# Patient Record
Sex: FEMALE | Race: WHITE | NOT HISPANIC OR LATINO | Employment: OTHER | URBAN - NONMETROPOLITAN AREA
[De-identification: names, ages, dates, MRNs, and addresses within clinical notes are randomized per-mention and may not be internally consistent; named-entity substitution may affect disease eponyms.]

---

## 2017-02-16 ENCOUNTER — OFFICE VISIT (OUTPATIENT)
Dept: CARDIOLOGY | Facility: CLINIC | Age: 82
End: 2017-02-16

## 2017-02-16 VITALS
BODY MASS INDEX: 32.15 KG/M2 | HEART RATE: 55 BPM | DIASTOLIC BLOOD PRESSURE: 70 MMHG | OXYGEN SATURATION: 89 % | WEIGHT: 149 LBS | HEIGHT: 57 IN | SYSTOLIC BLOOD PRESSURE: 140 MMHG

## 2017-02-16 DIAGNOSIS — Z95.1 HX OF CABG: Chronic | ICD-10-CM

## 2017-02-16 DIAGNOSIS — I25.10 ATHEROSCLEROSIS OF NATIVE CORONARY ARTERY OF NATIVE HEART WITHOUT ANGINA PECTORIS: Primary | Chronic | ICD-10-CM

## 2017-02-16 DIAGNOSIS — E78.2 MIXED HYPERLIPIDEMIA: Chronic | ICD-10-CM

## 2017-02-16 DIAGNOSIS — I10 ESSENTIAL HYPERTENSION: Chronic | ICD-10-CM

## 2017-02-16 DIAGNOSIS — I25.10 ATHEROSCLEROSIS OF NATIVE CORONARY ARTERY OF NATIVE HEART WITHOUT ANGINA PECTORIS: ICD-10-CM

## 2017-02-16 DIAGNOSIS — F17.201 MILD TOBACCO ABUSE IN SUSTAINED REMISSION: Chronic | ICD-10-CM

## 2017-02-16 DIAGNOSIS — Z78.9 STATIN INTOLERANCE: Chronic | ICD-10-CM

## 2017-02-16 PROBLEM — E78.5 HYPERLIPIDEMIA: Chronic | Status: ACTIVE | Noted: 2017-02-16

## 2017-02-16 PROCEDURE — 99214 OFFICE O/P EST MOD 30 MIN: CPT | Performed by: NURSE PRACTITIONER

## 2017-02-16 RX ORDER — LISINOPRIL 20 MG/1
20 TABLET ORAL DAILY
COMMUNITY
End: 2017-08-29 | Stop reason: SDUPTHER

## 2017-02-16 RX ORDER — METOPROLOL SUCCINATE 50 MG/1
50 TABLET, EXTENDED RELEASE ORAL 2 TIMES DAILY
COMMUNITY
End: 2018-01-01 | Stop reason: SDUPTHER

## 2017-02-16 RX ORDER — ASPIRIN 81 MG/1
81 TABLET, CHEWABLE ORAL DAILY
COMMUNITY

## 2017-02-16 RX ORDER — LEVOTHYROXINE SODIUM 0.15 MG/1
150 TABLET ORAL DAILY
COMMUNITY

## 2017-02-16 RX ORDER — MAGNESIUM OXIDE 400 MG/1
400 TABLET ORAL DAILY
COMMUNITY

## 2017-02-16 RX ORDER — VITAMIN B COMPLEX
100 TABLET ORAL DAILY
COMMUNITY
End: 2017-08-29 | Stop reason: ALTCHOICE

## 2017-02-16 RX ORDER — FUROSEMIDE 40 MG/1
40 TABLET ORAL 2 TIMES DAILY
COMMUNITY
End: 2018-01-01 | Stop reason: SDUPTHER

## 2017-02-16 RX ORDER — CLOPIDOGREL BISULFATE 75 MG/1
75 TABLET ORAL DAILY
COMMUNITY

## 2017-02-16 RX ORDER — AMLODIPINE BESYLATE 10 MG/1
10 TABLET ORAL DAILY
COMMUNITY

## 2017-02-16 RX ORDER — GLIPIZIDE 5 MG/1
5 TABLET ORAL DAILY
COMMUNITY

## 2017-02-16 RX ORDER — BUDESONIDE AND FORMOTEROL FUMARATE DIHYDRATE 160; 4.5 UG/1; UG/1
2 AEROSOL RESPIRATORY (INHALATION)
COMMUNITY

## 2017-02-16 RX ORDER — OMEPRAZOLE 20 MG/1
20 TABLET, DELAYED RELEASE ORAL DAILY
COMMUNITY

## 2017-02-16 NOTE — PROGRESS NOTES
Subjective:     Encounter Date:02/16/2017    Chief Complaint:    Patient ID: Mis Fontaine is a 81 y.o. female here today for cardiac follow-up.    HPI     Coronary Artery Disease    Additional comments: no chest pain, hx CABG           Hypertension    Additional comments: doesn't remember what it runs at home, better since losing a little weight           Hyperlipidemia    Additional comments: developed a rash after Repatha July 2016 (notes reveal she had a flare of gout)       Last edited by GIO Harris on 2/16/2017  6:19 PM. (History)        Coronary Artery Disease   Presents for follow-up visit. Symptoms include leg swelling and shortness of breath. Pertinent negatives include no chest pain, chest pressure, chest tightness, dizziness or palpitations. Risk factors include hyperlipidemia. The symptoms have been stable. Compliance with diet is variable. Compliance with exercise is poor. Compliance with medications is good.   Hypertension   This is a chronic problem. The current episode started more than 1 year ago. The problem is unchanged. The problem is controlled. Associated symptoms include malaise/fatigue, neck pain and shortness of breath. Pertinent negatives include no blurred vision, chest pain, headaches or palpitations. Risk factors for coronary artery disease include dyslipidemia, diabetes mellitus, obesity and sedentary lifestyle. Past treatments include beta blockers, ACE inhibitors and calcium channel blockers. Compliance problems include exercise.  Hypertensive end-organ damage includes CAD/MI.   Hyperlipidemia   This is a chronic problem. The current episode started more than 1 year ago. The problem is uncontrolled. Factors aggravating her hyperlipidemia include beta blockers. Associated symptoms include shortness of breath. Pertinent negatives include no chest pain. She is currently on no antihyperlipidemic treatment (statin intolerance). Compliance problems include adherence to  exercise.          History:   Past Medical History   Diagnosis Date   • Atherosclerosis of native coronary artery of native heart without angina pectoris    • Cardiac murmur, previously undiagnosed    • Chest pain      Unspecified   • Diabetes mellitus    • Fatigue      Unspecified   • GERD (gastroesophageal reflux disease)    • Gout    • Hyperlipidemia      Unspecified hyperlipidemia type   • Hypertension      Essential benign   • Hypothyroidism    • Nocturnal hypoxia    • Osteoarthritis    • Osteoporosis    • SOB (shortness of breath)    • Statin intolerance      Past Surgical History   Procedure Laterality Date   • Cataract extraction     • Carotid endarterectomy     • Cervical fusion     • Carotid stent  12/22/2015      L carotid stents x 2 - Consuelo   • Lumbar fusion     • Coronary artery bypass graft  10/24/2013     S/P LIMA to LAD, SVT to OM, SVG to distal RCA, Dr. Mccray, [facility]   • Carpal tunnel release       Both   • Tubal abdominal ligation     • Knee arthroscopy       Right     Social History     Social History   • Marital status:      Spouse name: N/A   • Number of children: N/A   • Years of education: N/A     Occupational History   • Not on file.     Social History Main Topics   • Smoking status: Former Smoker     Packs/day: 1.50     Years: 30.00     Types: Cigarettes     Start date: 1960     Quit date: 2/16/1992   • Smokeless tobacco: Never Used   • Alcohol use No   • Drug use: No   • Sexual activity: Not on file     Other Topics Concern   • Not on file     Social History Narrative     Family History   Problem Relation Age of Onset   • Coronary artery disease Father    • Hypertension Father    • Stroke Father      Completed Stroke   • Asthma Other    • Thyroid disease Other    • Hyperlipidemia Other        Outpatient Prescriptions Marked as Taking for the 2/16/17 encounter (Office Visit) with GIO Harris   Medication Sig Dispense Refill   • amLODIPine (NORVASC) 5 MG tablet Take 5 mg by  mouth Daily.     • aspirin 81 MG chewable tablet Chew 81 mg Daily.     • budesonide-formoterol (SYMBICORT) 160-4.5 MCG/ACT inhaler Inhale 2 puffs 2 (Two) Times a Day.     • clopidogrel (PLAVIX) 75 MG tablet Take 75 mg by mouth Daily.     • Coenzyme Q10 (COQ10) 100 MG capsule Take 100 mg by mouth Daily.     • furosemide (LASIX) 40 MG tablet Take 40 mg by mouth Daily.     • glipiZIDE (GLUCOTROL) 5 MG tablet Take 5 mg by mouth Daily.     • levothyroxine (SYNTHROID, LEVOTHROID) 150 MCG tablet Take 150 mcg by mouth Daily.     • lisinopril (PRINIVIL,ZESTRIL) 20 MG tablet Take 20 mg by mouth Daily.     • magnesium oxide (MAG-OX) 400 MG tablet Take 400 mg by mouth Daily.     • metoprolol succinate XL (TOPROL-XL) 50 MG 24 hr tablet Take 50 mg by mouth 2 (Two) Times a Day.     • O2 (OXYGEN) Inhale 2 L/min Daily.     • omeprazole OTC (PriLOSEC OTC) 20 MG EC tablet Take 20 mg by mouth Daily.     • SITagliptin (JANUVIA) 100 MG tablet Take 100 mg by mouth Daily.         Review of Systems:  Review of Systems   Constitution: Positive for malaise/fatigue. Negative for chills, decreased appetite and fever.   HENT: Negative for congestion, headaches and nosebleeds.    Eyes: Negative for blurred vision and double vision.   Cardiovascular: Positive for leg swelling. Negative for chest pain, irregular heartbeat, near-syncope, palpitations and syncope.   Respiratory: Positive for cough and shortness of breath. Negative for chest tightness.    Endocrine: Negative for cold intolerance and heat intolerance.   Hematologic/Lymphatic: Bruises/bleeds easily.   Skin: Negative for dry skin, itching and rash.   Musculoskeletal: Positive for arthritis, joint pain and neck pain. Negative for back pain and muscle cramps.   Gastrointestinal: Negative for abdominal pain, constipation, diarrhea, heartburn, melena, nausea and vomiting.   Genitourinary: Negative for dysuria, frequency and hematuria.   Neurological: Positive for light-headedness and loss of  "balance. Negative for dizziness and numbness.   Psychiatric/Behavioral: Positive for depression. The patient has insomnia. The patient is not nervous/anxious.             Objective:     Visit Vitals   • /70 (BP Location: Left arm, Patient Position: Sitting, Cuff Size: Adult)   • Pulse 55   • Ht 57\" (144.8 cm)   • Wt 149 lb (67.6 kg)   • SpO2 (!) 89%  Comment: on 2L/min (her portable oxygen)   • BMI 32.24 kg/m2         Physical Exam   Constitutional: She is oriented to person, place, and time. She appears well-developed and well-nourished.   Eyes: No scleral icterus.   Neck: No JVD present.   Cardiovascular: Normal rate, regular rhythm, normal heart sounds and intact distal pulses.  Exam reveals no gallop and no friction rub.    No murmur heard.  Pulmonary/Chest: Effort normal and breath sounds normal.   Musculoskeletal: She exhibits no edema.   Neurological: She is alert and oriented to person, place, and time.   Skin: Skin is warm and dry.   Psychiatric: She has a normal mood and affect.   Vitals reviewed.      Lab/Diagnostics Review:   02/04/2016  CBC WBC 8.9, Hgb 12.9, Hct 43.4, platelets 253,000  CMP sodium 142, chloride 103, potassium 6.1, CO2 34.1, calcium 9.3, glucose 122, alkaline phosphatase 183, AST 13, ALT 22, albumin 3.8, total protein 7.1  TSH 1.14  Lipid Panel , , HDL 43,   Mg 2.2, Hgb A1C 7.3%    01/13/2016 EKG sinus rhythm 61 beats, right axis deviation,nonspecific intraventricular block, non-specific T-wave abnormality, no previous for comparison    2/11/2015 Lipid Panel tootal cholesterol 205, triglyceride 255, HDL 39,    10/15/2015 TSH 1.12    04/13/2015 PFTs mild obstruction, no significant bronchodilator response, and normal lung volumes, increased airway resistance, moderately reduced diffusion capacity.     07/31/2013 Lexiscan sestamibi small apical defect consistent with ischemia, EF 95%    Procedures: none in office today           Assessment/Plan:     "     Mis was seen today for coronary artery disease, hypertension and hyperlipidemia.    Diagnoses and all orders for this visit:    Atherosclerosis of native coronary artery of native heart without angina pectoris  Comments:  stable, continue aspirin and Plavix, try Praluent  Orders:  -     Alirocumab 75 MG/ML solution prefilled syringe; Inject 75 mg under the skin Every 14 (Fourteen) Days.    Essential hypertension  Comments:  fair control with medications, goal BP less than 130/80    Mixed hyperlipidemia  Comments:  uncontrolled, statin intolerant, rash after Repatha, willing to try Praluent, sample given today, prescription renewed - may need new PA  Orders:  -     Alirocumab 75 MG/ML solution prefilled syringe; Inject 75 mg under the skin Every 14 (Fourteen) Days.    Statin intolerance  Comments:  rash (or gout flare) after Repatha, willing to try Praluent    Hx of CABG    Atherosclerosis of native coronary artery of native heart without angina pectoris  -     Alirocumab 75 MG/ML solution prefilled syringe; Inject 75 mg under the skin Every 14 (Fourteen) Days.    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         Tracey Feliciano, APRN, ACNP-BC, CHFN-BC

## 2017-08-29 ENCOUNTER — OFFICE VISIT (OUTPATIENT)
Dept: CARDIOLOGY | Facility: CLINIC | Age: 82
End: 2017-08-29

## 2017-08-29 VITALS
OXYGEN SATURATION: 86 % | DIASTOLIC BLOOD PRESSURE: 60 MMHG | HEART RATE: 58 BPM | HEIGHT: 57 IN | BODY MASS INDEX: 32.36 KG/M2 | WEIGHT: 150 LBS | SYSTOLIC BLOOD PRESSURE: 150 MMHG

## 2017-08-29 DIAGNOSIS — E78.2 MIXED HYPERLIPIDEMIA: Chronic | ICD-10-CM

## 2017-08-29 DIAGNOSIS — I10 ESSENTIAL HYPERTENSION: Chronic | ICD-10-CM

## 2017-08-29 DIAGNOSIS — F17.201 MILD TOBACCO ABUSE IN SUSTAINED REMISSION: Chronic | ICD-10-CM

## 2017-08-29 DIAGNOSIS — Z95.1 HX OF CABG: Chronic | ICD-10-CM

## 2017-08-29 DIAGNOSIS — B37.9 CANDIDA ALBICANS INFECTION: ICD-10-CM

## 2017-08-29 DIAGNOSIS — I25.10 ATHEROSCLEROSIS OF NATIVE CORONARY ARTERY OF NATIVE HEART WITHOUT ANGINA PECTORIS: Primary | Chronic | ICD-10-CM

## 2017-08-29 DIAGNOSIS — Z78.9 STATIN INTOLERANCE: Chronic | ICD-10-CM

## 2017-08-29 PROCEDURE — 99214 OFFICE O/P EST MOD 30 MIN: CPT | Performed by: NURSE PRACTITIONER

## 2017-08-29 RX ORDER — NYSTATIN 100000 [USP'U]/G
POWDER TOPICAL 3 TIMES DAILY
Qty: 30 G | Refills: 0 | Status: SHIPPED | OUTPATIENT
Start: 2017-08-29 | End: 2018-01-01

## 2017-08-29 RX ORDER — LISINOPRIL 20 MG/1
20 TABLET ORAL 2 TIMES DAILY
Qty: 60 TABLET | Refills: 11 | Status: SHIPPED | OUTPATIENT
Start: 2017-08-29 | End: 2018-08-14 | Stop reason: SDUPTHER

## 2017-08-29 RX ORDER — ACETAMINOPHEN AND CODEINE PHOSPHATE 300; 30 MG/1; MG/1
1 TABLET ORAL EVERY 4 HOURS PRN
COMMUNITY
End: 2018-01-01

## 2017-08-29 NOTE — PROGRESS NOTES
Subjective:     Encounter Date:08/29/2017    Chief Complaint:    Patient ID: Mis Fontaine is a 81 y.o. female here today for cardiac follow-up.    HPI     Coronary Artery Disease    Additional comments: hx CABG, no chest discomfort, wears oxygen but didn't bring in today           Hypertension    Additional comments: normally runs 140s at home       Last edited by GIO Hraris on 8/29/2017  2:06 PM. (History)          History:   Past Medical History:   Diagnosis Date   • Atherosclerosis of native coronary artery of native heart without angina pectoris    • Cardiac murmur, previously undiagnosed    • Chest pain     Unspecified   • Diabetes mellitus    • Fatigue     Unspecified   • GERD (gastroesophageal reflux disease)    • Gout    • Hyperlipidemia     Unspecified hyperlipidemia type   • Hypertension     Essential benign   • Hypothyroidism    • Nocturnal hypoxia    • Osteoarthritis    • Osteoporosis    • SOB (shortness of breath)    • Statin intolerance      Past Surgical History:   Procedure Laterality Date   • CAROTID ENDARTERECTOMY     • CAROTID STENT  12/22/2015     L carotid stents x 2 - Consuelo, RX [employer] 7mm x 30 mm   • CARPAL TUNNEL RELEASE      Both   • CATARACT EXTRACTION     • CERVICAL FUSION     • CORONARY ARTERY BYPASS GRAFT  10/24/2013    S/P LIMA to LAD, SVT to OM, SVG to distal RCA, Dr. Mccray, [facility]   • KNEE ARTHROSCOPY      Right   • LUMBAR FUSION     • TUBAL ABDOMINAL LIGATION       Social History     Social History   • Marital status:      Spouse name: N/A   • Number of children: N/A   • Years of education: N/A     Occupational History   • Not on file.     Social History Main Topics   • Smoking status: Former Smoker     Packs/day: 1.50     Years: 30.00     Types: Cigarettes     Start date: 1960     Quit date: 2/16/1992   • Smokeless tobacco: Never Used   • Alcohol use No   • Drug use: No   • Sexual activity: Defer     Other Topics Concern   • Not on file     Social History  Narrative     Family History   Problem Relation Age of Onset   • Coronary artery disease Father    • Hypertension Father    • Stroke Father      Completed Stroke   • Asthma Other    • Thyroid disease Other    • Hyperlipidemia Other        Outpatient Prescriptions Marked as Taking for the 8/29/17 encounter (Office Visit) with GIO Harris   Medication Sig Dispense Refill   • acetaminophen-codeine (TYLENOL #3) 300-30 MG per tablet Take 1 tablet by mouth Every 4 (Four) Hours As Needed for Moderate Pain .     • amLODIPine (NORVASC) 5 MG tablet Take 5 mg by mouth Daily.     • aspirin 81 MG chewable tablet Chew 81 mg Daily.     • budesonide-formoterol (SYMBICORT) 160-4.5 MCG/ACT inhaler Inhale 2 puffs 2 (Two) Times a Day.     • clopidogrel (PLAVIX) 75 MG tablet Take 75 mg by mouth Daily.     • furosemide (LASIX) 40 MG tablet Take 40 mg by mouth Daily.     • glipiZIDE (GLUCOTROL) 5 MG tablet Take 5 mg by mouth Daily.     • levothyroxine (SYNTHROID, LEVOTHROID) 150 MCG tablet Take 150 mcg by mouth Daily.     • lisinopril (PRINIVIL,ZESTRIL) 20 MG tablet Take 1 tablet by mouth 2 (Two) Times a Day. 60 tablet 11   • magnesium oxide (MAG-OX) 400 MG tablet Take 400 mg by mouth Daily.     • metoprolol succinate XL (TOPROL-XL) 50 MG 24 hr tablet Take 50 mg by mouth 2 (Two) Times a Day.     • O2 (OXYGEN) Inhale 2 L/min Daily.     • omeprazole OTC (PriLOSEC OTC) 20 MG EC tablet Take 20 mg by mouth Daily.     • SITagliptin (JANUVIA) 100 MG tablet Take 100 mg by mouth Daily.     • [DISCONTINUED] lisinopril (PRINIVIL,ZESTRIL) 20 MG tablet Take 20 mg by mouth Daily.     • [DISCONTINUED] O2 (OXYGEN) Inhale 2 L Continuous.         Review of Systems:  Review of Systems   Constitution: Positive for malaise/fatigue. Negative for chills, decreased appetite and fever.   HENT: Negative for congestion, headaches and nosebleeds.    Eyes: Negative for blurred vision and double vision.   Cardiovascular: Negative for chest pain,  "irregular heartbeat, leg swelling, near-syncope, palpitations and syncope.   Respiratory: Negative for cough and shortness of breath.    Endocrine: Negative for cold intolerance and heat intolerance.   Hematologic/Lymphatic: Bruises/bleeds easily.   Skin: Positive for rash (under L breast). Negative for dry skin and itching.   Musculoskeletal: Positive for arthritis, gout, joint pain (knees especially), muscle weakness (legs) and neck pain. Negative for back pain and muscle cramps.   Gastrointestinal: Negative for abdominal pain, constipation, diarrhea, heartburn, melena, nausea and vomiting.   Genitourinary: Negative for dysuria, frequency and hematuria.   Neurological: Positive for light-headedness and loss of balance. Negative for dizziness and numbness.   Psychiatric/Behavioral: Positive for depression. The patient has insomnia. The patient is not nervous/anxious.             Objective:   /60 (BP Location: Left arm, Patient Position: Sitting, Cuff Size: Large Adult)  Pulse 58  Ht 57\" (144.8 cm)  Wt 150 lb (68 kg)  SpO2 (!) 86% Comment: doesnt have her o2 with her today  BMI 32.46 kg/m2  Wt Readings from Last 3 Encounters:   08/29/17 150 lb (68 kg)   02/16/17 149 lb (67.6 kg)         Physical Exam   Constitutional: She is oriented to person, place, and time. She appears well-developed and well-nourished.   obese   Eyes: No scleral icterus.   Neck: No JVD present.   Cardiovascular: Normal rate, regular rhythm, normal heart sounds and intact distal pulses.  Exam reveals no gallop and no friction rub.    No murmur heard.  Pulmonary/Chest: Effort normal and breath sounds normal.   Macular rash in L breast fold   Abdominal:   obese   Musculoskeletal: She exhibits tenderness. She exhibits no edema.   Neurological: She is alert and oriented to person, place, and time.   Skin: Skin is warm and dry. Rash noted. Rash is macular ( under L breast in fold).   Psychiatric: She has a normal mood and affect.   Vitals " reviewed.      Lab/Diagnostics Review:   02/04/2016  CBC WBC 8.9, Hgb 12.9, Hct 43.4, platelets 253,000  CMP sodium 142, chloride 103, potassium 6.1, CO2 34.1, calcium 9.3, glucose 122, alkaline phosphatase 183, AST 13, ALT 22, albumin 3.8, total protein 7.1  TSH 1.14  Lipid Panel , , HDL 43,   Mg 2.2, Hgb A1C 7.3%     01/13/2016 EKG sinus rhythm 61 beats, right axis deviation,nonspecific intraventricular block, non-specific T-wave abnormality, no previous for comparison     2/11/2015 Lipid Panel tootal cholesterol 205, triglyceride 255, HDL 39,    10/15/2015 TSH 1.12     04/13/2015 PFTs mild obstruction, no significant bronchodilator response, and normal lung volumes, increased airway resistance, moderately reduced diffusion capacity.      07/31/2013 Lexiscan sestamibi small apical defect consistent with ischemia, EF 95%    Procedures: none in office today        Assessment/Plan:         Mis was seen today for coronary artery disease, hypertension and hyperlipidemia.    Diagnoses and all orders for this visit:    Atherosclerosis of native coronary artery of native heart without angina pectoris  Comments:  Stable without angina. Continue medications. Encouraged increased activity.     Essential hypertension  Comments:  could stand better control per today's reading, increase lisinopril to 20 mg twice a day, goal BP less than 130/80  Orders:  -     lisinopril (PRINIVIL,ZESTRIL) 20 MG tablet; Take 1 tablet by mouth 2 (Two) Times a Day.    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 Suarez if doesn't clear with Nystatin powder  Orders:  -     nystatin (MYCOSTATIN) [id number] UNIT/GM powder; Apply  topically 3 (Three) Times a Day. Until rash gone        Return in about 6 months (around 2/28/2018) for Recheck.           GIO Sanchez, ACNP-BC, CHFN-BC

## 2018-01-01 ENCOUNTER — OFFICE VISIT (OUTPATIENT)
Dept: CARDIOLOGY | Facility: CLINIC | Age: 83
End: 2018-01-01

## 2018-01-01 ENCOUNTER — TELEPHONE (OUTPATIENT)
Dept: CARDIOLOGY | Facility: CLINIC | Age: 83
End: 2018-01-01

## 2018-01-01 ENCOUNTER — RESULTS ENCOUNTER (OUTPATIENT)
Dept: CARDIOLOGY | Facility: CLINIC | Age: 83
End: 2018-01-01

## 2018-01-01 ENCOUNTER — OUTSIDE FACILITY SERVICE (OUTPATIENT)
Dept: CARDIOLOGY | Facility: CLINIC | Age: 83
End: 2018-01-01

## 2018-01-01 VITALS
OXYGEN SATURATION: 91 % | DIASTOLIC BLOOD PRESSURE: 60 MMHG | BODY MASS INDEX: 31.11 KG/M2 | HEIGHT: 57 IN | SYSTOLIC BLOOD PRESSURE: 130 MMHG | HEART RATE: 51 BPM | WEIGHT: 144.2 LBS

## 2018-01-01 VITALS
BODY MASS INDEX: 31.41 KG/M2 | WEIGHT: 145.6 LBS | HEIGHT: 57 IN | HEART RATE: 51 BPM | DIASTOLIC BLOOD PRESSURE: 64 MMHG | SYSTOLIC BLOOD PRESSURE: 108 MMHG | OXYGEN SATURATION: 87 %

## 2018-01-01 VITALS
RESPIRATION RATE: 18 BRPM | DIASTOLIC BLOOD PRESSURE: 90 MMHG | BODY MASS INDEX: 33.01 KG/M2 | HEART RATE: 88 BPM | WEIGHT: 153 LBS | SYSTOLIC BLOOD PRESSURE: 160 MMHG | OXYGEN SATURATION: 90 % | HEIGHT: 57 IN

## 2018-01-01 DIAGNOSIS — I25.10 ATHEROSCLEROSIS OF NATIVE CORONARY ARTERY OF NATIVE HEART WITHOUT ANGINA PECTORIS: Primary | Chronic | ICD-10-CM

## 2018-01-01 DIAGNOSIS — E66.09 CLASS 1 OBESITY DUE TO EXCESS CALORIES WITH SERIOUS COMORBIDITY AND BODY MASS INDEX (BMI) OF 31.0 TO 31.9 IN ADULT: Chronic | ICD-10-CM

## 2018-01-01 DIAGNOSIS — E78.2 MIXED HYPERLIPIDEMIA: Chronic | ICD-10-CM

## 2018-01-01 DIAGNOSIS — F17.201 MILD TOBACCO ABUSE IN SUSTAINED REMISSION: Chronic | ICD-10-CM

## 2018-01-01 DIAGNOSIS — Z78.9 STATIN INTOLERANCE: Chronic | ICD-10-CM

## 2018-01-01 DIAGNOSIS — Z95.1 HX OF CABG: Chronic | ICD-10-CM

## 2018-01-01 DIAGNOSIS — G47.34 NOCTURNAL HYPOXIA: Chronic | ICD-10-CM

## 2018-01-01 DIAGNOSIS — R06.02 SOB (SHORTNESS OF BREATH): Chronic | ICD-10-CM

## 2018-01-01 DIAGNOSIS — N18.30 STAGE 3 CHRONIC KIDNEY DISEASE (HCC): Chronic | ICD-10-CM

## 2018-01-01 DIAGNOSIS — I50.32 CHRONIC DIASTOLIC HEART FAILURE (HCC): ICD-10-CM

## 2018-01-01 DIAGNOSIS — I10 ESSENTIAL HYPERTENSION: Chronic | ICD-10-CM

## 2018-01-01 DIAGNOSIS — I50.32 CHRONIC DIASTOLIC HEART FAILURE (HCC): Chronic | ICD-10-CM

## 2018-01-01 PROCEDURE — 93306 TTE W/DOPPLER COMPLETE: CPT | Performed by: INTERNAL MEDICINE

## 2018-01-01 PROCEDURE — 93350 STRESS TTE ONLY: CPT | Performed by: INTERNAL MEDICINE

## 2018-01-01 PROCEDURE — 93000 ELECTROCARDIOGRAM COMPLETE: CPT | Performed by: NURSE PRACTITIONER

## 2018-01-01 PROCEDURE — 99214 OFFICE O/P EST MOD 30 MIN: CPT | Performed by: NURSE PRACTITIONER

## 2018-01-01 RX ORDER — CHLORAL HYDRATE 500 MG
CAPSULE ORAL
COMMUNITY

## 2018-01-01 RX ORDER — METOPROLOL SUCCINATE 50 MG/1
50 TABLET, EXTENDED RELEASE ORAL 3 TIMES DAILY
Qty: 90 TABLET | Refills: 11 | Status: SHIPPED | OUTPATIENT
Start: 2018-01-01 | End: 2019-09-14

## 2018-01-01 RX ORDER — FERROUS SULFATE 325(65) MG
325 TABLET ORAL
COMMUNITY

## 2018-01-01 RX ORDER — ALLOPURINOL 300 MG/1
300 TABLET ORAL DAILY
COMMUNITY

## 2018-01-01 RX ORDER — FUROSEMIDE 40 MG/1
40-80 TABLET ORAL 2 TIMES DAILY
Qty: 120 TABLET | Refills: 11 | Status: SHIPPED | OUTPATIENT
Start: 2018-01-01 | End: 2019-09-14

## 2018-01-01 RX ORDER — GABAPENTIN 100 MG/1
100 CAPSULE ORAL DAILY
COMMUNITY
Start: 2018-01-01

## 2018-02-22 LAB — HBA1C MFR BLD: 7.4 %

## 2018-03-05 ENCOUNTER — OFFICE VISIT (OUTPATIENT)
Dept: CARDIOLOGY | Facility: CLINIC | Age: 83
End: 2018-03-05

## 2018-03-05 VITALS
SYSTOLIC BLOOD PRESSURE: 120 MMHG | HEART RATE: 65 BPM | DIASTOLIC BLOOD PRESSURE: 70 MMHG | BODY MASS INDEX: 31.5 KG/M2 | WEIGHT: 146 LBS | HEIGHT: 57 IN | OXYGEN SATURATION: 87 %

## 2018-03-05 DIAGNOSIS — Z78.9 STATIN INTOLERANCE: Chronic | ICD-10-CM

## 2018-03-05 DIAGNOSIS — I10 ESSENTIAL HYPERTENSION: Chronic | ICD-10-CM

## 2018-03-05 DIAGNOSIS — E78.2 MIXED HYPERLIPIDEMIA: Chronic | ICD-10-CM

## 2018-03-05 DIAGNOSIS — I25.10 ATHEROSCLEROSIS OF NATIVE CORONARY ARTERY OF NATIVE HEART WITHOUT ANGINA PECTORIS: Primary | Chronic | ICD-10-CM

## 2018-03-05 DIAGNOSIS — Z95.1 HX OF CABG: Chronic | ICD-10-CM

## 2018-03-05 DIAGNOSIS — F17.201 MILD TOBACCO ABUSE IN SUSTAINED REMISSION: Chronic | ICD-10-CM

## 2018-03-05 DIAGNOSIS — E66.09 CLASS 1 OBESITY DUE TO EXCESS CALORIES WITH SERIOUS COMORBIDITY AND BODY MASS INDEX (BMI) OF 31.0 TO 31.9 IN ADULT: Chronic | ICD-10-CM

## 2018-03-05 PROBLEM — E66.9 CLASS 1 OBESITY WITH BODY MASS INDEX (BMI) OF 31.0 TO 31.9 IN ADULT: Chronic | Status: ACTIVE | Noted: 2018-03-05

## 2018-03-05 PROCEDURE — 93000 ELECTROCARDIOGRAM COMPLETE: CPT | Performed by: NURSE PRACTITIONER

## 2018-03-05 PROCEDURE — 99214 OFFICE O/P EST MOD 30 MIN: CPT | Performed by: NURSE PRACTITIONER

## 2018-03-05 NOTE — PATIENT INSTRUCTIONS
Alirocumab injection  What is this medicine?  ALIROCUMAB (al i JAILYN ue mab) is known as a PCSK9 inhibitor. It is used to lower the level of cholesterol in the blood. This medicine is only for patients whose cholesterol is not controlled by diet and statin therapy.  This medicine may be used for other purposes; ask your health care provider or pharmacist if you have questions.  COMMON BRAND NAME(S): Praluent  What should I tell my health care provider before I take this medicine?  They need to know if you have any of these conditions:  -any unusual or allergic reaction to alirocumab, other medicines, foods, dyes, or preservatives  -pregnant or trying to get pregnant  -breast-feeding  How should I use this medicine?  This medicine is for injection under the skin. You will be taught how to prepare and give this medicine. Use exactly as directed. Take your medicine at regular intervals. Do not take your medicine more often than directed.  It is important that you put your used needles and syringes in a special sharps container. Do not put them in a trash can. If you do not have a sharps container, call your pharmacist or healthcare provider to get one.  Talk to your pediatrician regarding the use of this medicine in children. Special care may be needed.  Overdosage: If you think you have taken too much of this medicine contact a poison control center or emergency room at once.  NOTE: This medicine is only for you. Do not share this medicine with others.  What if I miss a dose?  If you are on an every 2 week schedule and miss a dose, take it as soon as you can. If your next dose is to be taken in less than 7 days, then do not take the missed dose. Take the next dose at your regular time. Do not take double or extra doses. If you are on a monthly schedule and miss a dose, take it as soon as you can. If the dose given is within 7 days of the missed dose, continue with your regular monthly schedule. If the missed dose is  administered after 7 days of the original date, administer the dose and start a new monthly schedule based on this date.  What may interact with this medicine?  Interactions are not expected.  This list may not describe all possible interactions. Give your health care provider a list of all the medicines, herbs, non-prescription drugs, or dietary supplements you use. Also tell them if you smoke, drink alcohol, or use illegal drugs. Some items may interact with your medicine.  What should I watch for while using this medicine?  You may need blood work done while you are taking this medicine.  What side effects may I notice from receiving this medicine?  Side effects that you should report to your doctor or health care professional as soon as possible:  -allergic reactions like skin rash, itching or hives, swelling of the face, lips, or tongue  -signs and symptoms of infection like fever or chills; cough; sore throat; pain or trouble passing urine  -signs and symptoms of liver injury like dark yellow or brown urine; general ill feeling or flu-like symptoms; light-colored stools; loss of appetite; nausea; right upper belly pain; unusually weak or tired; yellowing of the eyes or skin  Side effects that usually do not require medical attention (report to your doctor or health care professional if they continue or are bothersome):  -diarrhea  -muscle cramps  -muscle pain  -pain, redness, or irritation at site where injected  This list may not describe all possible side effects. Call your doctor for medical advice about side effects. You may report side effects to FDA at 3-681-FDA-0596.  Where should I keep my medicine?  Keep out of the reach of children.  You will be instructed on how to store this medicine. Throw away any unused medicine after the expiration date on the label.  NOTE: This sheet is a summary. It may not cover all possible information. If you have questions about this medicine, talk to your doctor,  "pharmacist, or health care provider.  © 2018 Elsevier/Gold Standard (2017-04-26 15:09:06)      Fat and Cholesterol Restricted Diet  Getting too much fat and cholesterol in your diet may cause health problems. Following this diet helps keep your fat and cholesterol at normal levels. This can keep you from getting sick.  What types of fat should I choose?  · Choose monosaturated and polyunsaturated fats. These are found in foods such as olive oil, canola oil, flaxseeds, walnuts, almonds, and seeds.  · Eat more omega-3 fats. Good choices include salmon, mackerel, sardines, tuna, flaxseed oil, and ground flaxseeds.  · Limit saturated fats. These are in animal products such as meats, butter, and cream. They can also be in plant products such as palm oil, palm kernel oil, and coconut oil.  · Avoid foods with partially hydrogenated oils in them. These contain trans fats. Examples of foods that have trans fats are stick margarine, some tub margarines, cookies, crackers, and other baked goods.  What general guidelines do I need to follow?  · Check food labels. Look for the words \"trans fat\" and \"saturated fat.\"  · When preparing a meal:  ¨ Fill half of your plate with vegetables and green salads.  ¨ Fill one fourth of your plate with whole grains. Look for the word \"whole\" as the first word in the ingredient list.  ¨ Fill one fourth of your plate with lean protein foods.  · Eat more foods that have fiber, like apples, carrots, beans, peas, and barley.  · Eat more home-cooked foods. Eat less at restaurants and buffets.  · Limit or avoid alcohol.  · Limit foods high in starch and sugar.  · Limit fried foods.  · Cook foods without frying them. Baking, boiling, grilling, and broiling are all great options.  · Lose weight if you are overweight. Losing even a small amount of weight can help your overall health. It can also help prevent diseases such as diabetes and heart disease.  What foods can I eat?  Grains   Whole grains, such " as whole wheat or whole grain breads, crackers, cereals, and pasta. Unsweetened oatmeal, bulgur, barley, quinoa, or brown rice. Corn or whole wheat flour tortillas.  Vegetables   Fresh or frozen vegetables (raw, steamed, roasted, or grilled). Green salads.  Fruits   All fresh, canned (in natural juice), or frozen fruits.  Meat and Other Protein Products   Ground beef (85% or leaner), grass-fed beef, or beef trimmed of fat. Skinless chicken or turkey. Ground chicken or turkey. Pork trimmed of fat. All fish and seafood. Eggs. Dried beans, peas, or lentils. Unsalted nuts or seeds. Unsalted canned or dry beans.  Dairy   Low-fat dairy products, such as skim or 1% milk, 2% or reduced-fat cheeses, low-fat ricotta or cottage cheese, or plain low-fat yogurt.  Fats and Oils   Tub margarines without trans fats. Light or reduced-fat mayonnaise and salad dressings. Avocado. Olive, canola, sesame, or safflower oils. Natural peanut or almond butter (choose ones without added sugar and oil).  The items listed above may not be a complete list of recommended foods or beverages. Contact your dietitian for more options.   What foods are not recommended?  Grains   White bread. White pasta. White rice. Cornbread. Bagels, pastries, and croissants. Crackers that contain trans fat.  Vegetables   White potatoes. Corn. Creamed or fried vegetables. Vegetables in a cheese sauce.  Fruits   Dried fruits. Canned fruit in light or heavy syrup. Fruit juice.  Meat and Other Protein Products   Fatty cuts of meat. Ribs, chicken wings, ashley, sausage, bologna, salami, chitterlings, fatback, hot dogs, bratwurst, and packaged luncheon meats. Liver and organ meats.  Dairy   Whole or 2% milk, cream, half-and-half, and cream cheese. Whole milk cheeses. Whole-fat or sweetened yogurt. Full-fat cheeses. Nondairy creamers and whipped toppings. Processed cheese, cheese spreads, or cheese curds.  Sweets and Desserts   Corn syrup, sugars, honey, and molasses.  Candy. Jam and jelly. Syrup. Sweetened cereals. Cookies, pies, cakes, donuts, muffins, and ice cream.  Fats and Oils   Butter, stick margarine, lard, shortening, ghee, or ashley fat. Coconut, palm kernel, or palm oils.  Beverages   Alcohol. Sweetened drinks (such as sodas, lemonade, and fruit drinks or punches).  The items listed above may not be a complete list of foods and beverages to avoid. Contact your dietitian for more information.   This information is not intended to replace advice given to you by your health care provider. Make sure you discuss any questions you have with your health care provider.  Document Released: 06/18/2013 Document Revised: 08/24/2017 Document Reviewed: 03/19/2015  [employer] Interactive Patient Education © 2017 [employer].

## 2018-03-05 NOTE — PROGRESS NOTES
Subjective:     Encounter Date:03/05/2018    Chief Complaint:    Patient ID: Mis Fontaine is a 82 y.o. female here today for cardiac follow-up.    HPI     Coronary Artery Disease    Additional comments: no chest discomfort, hx CABG, didn't wear oxygen in today, wears at home all the time and while driving and into grocery and [employer] but doesn't pack into office visits           Hypertension    Additional comments: controlled with medications, has been 130-140s/50-70s           Carotid Artery Disease    Additional comments: s/p B CEA (remote) and L ICA stents (2015, [facility], Dr. Montero), hasn't had ultrasound recently, she thinks Dr. De Dios has ordered       Last edited by GIO Hraris on 3/5/2018  1:02 PM. (History)        History:   Past Medical History:   Diagnosis Date   • Atherosclerosis of native coronary artery of native heart without angina pectoris    • Cardiac murmur, previously undiagnosed    • Chest pain     Unspecified   • Chronic kidney disease    • Diabetes mellitus    • Fatigue     Unspecified   • GERD (gastroesophageal reflux disease)    • Gout    • Hyperlipidemia     Unspecified hyperlipidemia type   • Hypertension     Essential benign   • Hypothyroidism    • Nocturnal hypoxia    • Osteoarthritis    • Osteoporosis    • Pneumonia    • SOB (shortness of breath)    • Statin intolerance      Past Surgical History:   Procedure Laterality Date   • CAROTID ENDARTERECTOMY     • CAROTID STENT  12/22/2015     L carotid stents x 2 - [facility], RX Acculink 7mm x 30 mm   • CARPAL TUNNEL RELEASE      Both   • CATARACT EXTRACTION     • CERVICAL FUSION     • CORONARY ARTERY BYPASS GRAFT  10/24/2013    S/P LIMA to LAD, SVT to OM, SVG to distal RCA, Dr. Mccray, [facility]   • KNEE ARTHROSCOPY      Right   • LUMBAR FUSION     • TUBAL ABDOMINAL LIGATION       Social History     Social History   • Marital status:      Spouse name: N/A   • Number of children: N/A   • Years of education: N/A     Occupational  History   • Not on file.     Social History Main Topics   • Smoking status: Former Smoker     Packs/day: 1.50     Years: 30.00     Types: Cigarettes     Start date: 1960     Quit date: 2/16/1992   • Smokeless tobacco: Never Used   • Alcohol use No   • Drug use: No   • Sexual activity: Not Currently     Partners: Male     Other Topics Concern   • Not on file     Social History Narrative     Family History   Problem Relation Age of Onset   • Coronary artery disease Father    • Hypertension Father    • Stroke Father      Completed Stroke   • Asthma Other    • Thyroid disease Other    • Hyperlipidemia Other        Outpatient Prescriptions Marked as Taking for the 3/5/18 encounter (Office Visit) with GIO Harris   Medication Sig Dispense Refill   • acetaminophen-codeine (TYLENOL #3) 300-30 MG per tablet Take 1 tablet by mouth Every 4 (Four) Hours As Needed for Moderate Pain .     • amLODIPine (NORVASC) 5 MG tablet Take 5 mg by mouth Daily.     • aspirin 81 MG chewable tablet Chew 81 mg Daily.     • budesonide-formoterol (SYMBICORT) 160-4.5 MCG/ACT inhaler Inhale 2 puffs 2 (Two) Times a Day.     • clopidogrel (PLAVIX) 75 MG tablet Take 75 mg by mouth Daily.     • furosemide (LASIX) 40 MG tablet Take 40 mg by mouth Daily.     • glipiZIDE (GLUCOTROL) 5 MG tablet Take 5 mg by mouth Daily.     • levothyroxine (SYNTHROID, LEVOTHROID) 150 MCG tablet Take 150 mcg by mouth Daily.     • lisinopril (PRINIVIL,ZESTRIL) 20 MG tablet Take 1 tablet by mouth 2 (Two) Times a Day. 60 tablet 11   • magnesium oxide (MAG-OX) 400 MG tablet Take 400 mg by mouth Daily.     • metoprolol succinate XL (TOPROL-XL) 50 MG 24 hr tablet Take 50 mg by mouth 2 (Two) Times a Day.     • nystatin (MYCOSTATIN) 990204 UNIT/GM powder Apply  topically 3 (Three) Times a Day. Until rash gone 30 g 0   • omeprazole OTC (PriLOSEC OTC) 20 MG EC tablet Take 20 mg by mouth Daily.     • SITagliptin (JANUVIA) 100 MG tablet Take 50 mg by mouth Daily.    "      Review of Systems:  Review of Systems   Constitution: Positive for malaise/fatigue. Negative for chills, decreased appetite and fever.   HENT: Negative for congestion and nosebleeds.    Eyes: Negative for blurred vision and double vision.   Cardiovascular: Negative for chest pain, irregular heartbeat, leg swelling, near-syncope, palpitations and syncope.   Respiratory: Positive for cough. Negative for shortness of breath.    Endocrine: Negative for cold intolerance and heat intolerance.   Hematologic/Lymphatic: Bruises/bleeds easily.   Skin: Negative for dry skin, itching and rash (under L breast).   Musculoskeletal: Positive for arthritis, gout, joint pain (knees especially), muscle weakness (legs) and neck pain. Negative for back pain and muscle cramps.   Gastrointestinal: Negative for abdominal pain, constipation, diarrhea, heartburn, melena, nausea and vomiting.   Genitourinary: Negative for dysuria, frequency and hematuria.   Neurological: Positive for loss of balance. Negative for dizziness, headaches, light-headedness and numbness.   Psychiatric/Behavioral: Negative for depression. The patient has insomnia. The patient is not nervous/anxious.             Objective:   /70 (BP Location: Left arm, Patient Position: Sitting, Cuff Size: Adult)  Pulse 65  Ht 144.8 cm (57\")  Wt 66.2 kg (146 lb)  SpO2 (!) 87% Comment: uses oxygen all the time  BMI 31.59 kg/m2  Wt Readings from Last 3 Encounters:   03/05/18 66.2 kg (146 lb)   08/29/17 68 kg (150 lb)   02/16/17 67.6 kg (149 lb)         Physical Exam   Constitutional: She is oriented to person, place, and time. She appears well-developed and well-nourished.   obese   Eyes: No scleral icterus.   Neck: No JVD present.   Cardiovascular: Normal rate, regular rhythm, normal heart sounds and intact distal pulses.  Exam reveals no gallop and no friction rub.    No murmur heard.  Pulmonary/Chest: Effort normal. She has decreased breath sounds.   Abdominal: "   obese   Musculoskeletal: She exhibits tenderness. She exhibits no edema.   Neurological: She is alert and oriented to person, place, and time.   Skin: Skin is warm and dry.   Psychiatric: She has a normal mood and affect.   Vitals reviewed.      Lab/Diagnostics Review:   02/22/2018  CMP sodium 141, potassium 5.1, chloride 102, CO2 33, BUN 23, creatinine 1.2, calcium 10.0, glucose 128, total protein 7.6, albumin 3.7, total bilirubin 0.6, alkaline phosphatase 160, AST 13, ALT 16  Hemoglobin A1c 7.4%  TSH 3.91    02/04/2016  CBC WBC 8.9, Hgb 12.9, Hct 43.4, platelets 253,000  CMP sodium 142, chloride 103, potassium 6.1, CO2 34.1, calcium 9.3, glucose 122, alkaline phosphatase 183, AST 13, ALT 22, albumin 3.8, total protein 7.1  TSH 1.14  Lipid Panel , , HDL 43,   Mg 2.2, Hgb A1C 7.3%      01/13/2016 EKG sinus rhythm 61 beats, right axis deviation,nonspecific intraventricular block, non-specific T-wave abnormality, no previous for comparison      2/11/2015 Lipid Panel total cholesterol 205, triglyceride 255, HDL 39,    10/15/2015 TSH 1.12      04/13/2015 PFTs mild obstruction, no significant bronchodilator response, and normal lung volumes, increased airway resistance, moderately reduced diffusion capacity.       07/31/2013 Lexiscan sestamibi small apical defect consistent with ischemia, EF 95%      ECG 12 Lead  Date/Time: 3/5/2018 1:13 PM  Performed by: SASHA NAVAS  Authorized by: SASHA NAVAS   Comparison: compared with previous ECG from 1/13/2016  Comparison to previous ECG: Nonspecific intraventricular block replaced by R BBB  Rhythm: sinus rhythm  Rhythm comments: with sinus arrhythmia  Rate: normal  BPM: 68  Conduction: right bundle branch block  Clinical impression: abnormal ECG                Assessment/Plan:         Mis was seen today for coronary artery disease, hypertension and carotid artery disease.    Diagnoses and all orders for this visit:    Atherosclerosis  of native coronary artery of native heart without angina pectoris  Comments:  stable, continue aspirin, plavix, metoprolol. Encouraged routine aerobic exercise (with oxygen) and healthy diet.   Orders:  -     ECG 12 Lead    Essential hypertension  Comments:  goal less than 130/80 - call if running greater than 130 - may need to adjust medications, limit salt and processed foods, increase activity    Mixed hyperlipidemia  Comments:  healthy diet since unable to tolerate statins and site reaction to Repatha, willing to try Praluent - might consider bile acid sequestrant  Orders:  -     Lipid Panel; Future    Statin intolerance  Comments:  has tried multiple statins, will try Praluent (had injection site reaction to Repatha), if unable to tolerate Praluent will consider bile acid sequestrant    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           Tracey Feliciano, APRN, ACNP-BC, CHFN-BC

## 2018-03-06 ENCOUNTER — RESULTS ENCOUNTER (OUTPATIENT)
Dept: CARDIOLOGY | Facility: CLINIC | Age: 83
End: 2018-03-06

## 2018-03-06 DIAGNOSIS — E78.2 MIXED HYPERLIPIDEMIA: Chronic | ICD-10-CM

## 2018-07-27 ENCOUNTER — HOSPITAL ENCOUNTER (OUTPATIENT)
Dept: VASCULAR LAB | Age: 83
Discharge: HOME | End: 2018-07-27
Payer: MEDICARE

## 2018-07-27 ENCOUNTER — HOSPITAL ENCOUNTER (OUTPATIENT)
Dept: NEUROLOGY | Age: 83
Discharge: HOME | End: 2018-07-27
Payer: MEDICARE

## 2018-07-27 PROCEDURE — 93923 UPR/LXTR ART STDY 3+ LVLS: CPT

## 2018-07-27 PROCEDURE — 95909 NRV CNDJ TST 5-6 STUDIES: CPT | Performed by: PSYCHIATRY & NEUROLOGY

## 2018-07-27 PROCEDURE — 95886 MUSC TEST DONE W/N TEST COMP: CPT | Performed by: PSYCHIATRY & NEUROLOGY

## 2018-07-27 PROCEDURE — 95909 NRV CNDJ TST 5-6 STUDIES: CPT

## 2018-07-27 PROCEDURE — 95886 MUSC TEST DONE W/N TEST COMP: CPT

## 2018-08-14 DIAGNOSIS — I10 ESSENTIAL HYPERTENSION: Chronic | ICD-10-CM

## 2018-08-14 RX ORDER — LISINOPRIL 20 MG/1
20 TABLET ORAL 2 TIMES DAILY
Qty: 180 TABLET | Refills: 3 | Status: SHIPPED | OUTPATIENT
Start: 2018-08-14 | End: 2019-01-01 | Stop reason: SINTOL

## 2018-08-14 NOTE — TELEPHONE ENCOUNTER
Patient needing refill on lisinopril 20mg taken twice daily.  Patient last seen by Tracey Feliciano on 03/05/2018.

## 2018-09-14 PROBLEM — R06.02 SOB (SHORTNESS OF BREATH): Chronic | Status: ACTIVE | Noted: 2018-01-01

## 2018-09-14 NOTE — PROGRESS NOTES
Subjective:     Encounter Date:09/14/2018    Chief Complaint:    Patient ID: Mis Fontaine is a 82 y.o. female here today for 6 month cardiac follow-up. She is accompanied by her daughter who has moved in with her from [facility].    HPI     Coronary Artery Disease    Additional comments: Denies chest pain/discomfort. Swelling has gotten worse in BLEs - worse in RLE (hx SVG). Has gained 7 lbs in 6 months. Wearing Oxygen 24/7 but still been soa.            Hypertension    Additional comments: Blood pressure running higher at home but hasn't called for medication adjustments. Avoiding high sodium foods.       Last edited by Tracey Feliciano APRN on 9/14/2018  2:00 PM. (History)        History:   Past Medical History:   Diagnosis Date   • Atherosclerosis of native coronary artery of native heart without angina pectoris    • Cardiac murmur, previously undiagnosed    • Chest pain     Unspecified   • Chronic kidney disease    • Diabetes mellitus (CMS/HCC)    • Fatigue     Unspecified   • GERD (gastroesophageal reflux disease)    • Gout    • Hyperlipidemia     Unspecified hyperlipidemia type   • Hypertension     Essential benign   • Hypothyroidism    • Nocturnal hypoxia    • Osteoarthritis    • Osteoporosis    • Pneumonia    • SOB (shortness of breath)    • Statin intolerance      Past Surgical History:   Procedure Laterality Date   • CAROTID ENDARTERECTOMY     • CAROTID STENT  12/22/2015     L carotid stents x 2 - Consuelo, RX Acculink 7mm x 30 mm   • CARPAL TUNNEL RELEASE      Both   • CATARACT EXTRACTION     • CERVICAL FUSION     • CORONARY ARTERY BYPASS GRAFT  10/24/2013    S/P LIMA to LAD, SVT to OM, SVG to distal RCA, Dr. Mccray, [facility]   • KNEE ARTHROSCOPY      Right   • LUMBAR FUSION     • TUBAL ABDOMINAL LIGATION       Social History     Social History   • Marital status:      Spouse name: N/A   • Number of children: N/A   • Years of education: N/A     Occupational History   • Not on file.     Social  History Main Topics   • Smoking status: Former Smoker     Packs/day: 1.50     Years: 30.00     Types: Cigarettes     Start date: 1960     Quit date: 2/16/1992   • Smokeless tobacco: Never Used   • Alcohol use No   • Drug use: No   • Sexual activity: Not Currently     Partners: Male     Other Topics Concern   • Not on file     Social History Narrative   • No narrative on file     Family History   Problem Relation Age of Onset   • Coronary artery disease Father    • Hypertension Father    • Stroke Father         Completed Stroke   • Asthma Other    • Thyroid disease Other    • Hyperlipidemia Other        Outpatient Prescriptions Marked as Taking for the 9/14/18 encounter (Office Visit) with Tracey Feliciano APRN   Medication Sig Dispense Refill   • acetaminophen-codeine (TYLENOL #3) 300-30 MG per tablet Take 1 tablet by mouth Every 4 (Four) Hours As Needed for Moderate Pain .     • amLODIPine (NORVASC) 5 MG tablet Take 5 mg by mouth Daily.     • aspirin 81 MG chewable tablet Chew 81 mg Daily.     • budesonide-formoterol (SYMBICORT) 160-4.5 MCG/ACT inhaler Inhale 2 puffs 2 (Two) Times a Day.     • clopidogrel (PLAVIX) 75 MG tablet Take 75 mg by mouth Daily.     • furosemide (LASIX) 40 MG tablet Take 1-2 tablets by mouth 2 (Two) Times a Day. 120 tablet 11   • glipiZIDE (GLUCOTROL) 5 MG tablet Take 5 mg by mouth Daily.     • levothyroxine (SYNTHROID, LEVOTHROID) 150 MCG tablet Take 150 mcg by mouth Daily.     • lisinopril (PRINIVIL,ZESTRIL) 20 MG tablet Take 1 tablet by mouth 2 (Two) Times a Day. 180 tablet 3   • magnesium oxide (MAG-OX) 400 MG tablet Take 400 mg by mouth Daily.     • metoprolol succinate XL (TOPROL-XL) 50 MG 24 hr tablet Take 1 tablet by mouth 3 (Three) Times a Day. 90 tablet 11   • nystatin (MYCOSTATIN) 693082 UNIT/GM powder Apply  topically 3 (Three) Times a Day. Until rash gone 30 g 0   • O2 (OXYGEN) Inhale 2 L/min Continuous.     • omeprazole OTC (PriLOSEC OTC) 20 MG EC tablet Take 20 mg by  "mouth Daily.     • Red Yeast Rice 500 MG/0.5GM powder red yeast rice     • SITagliptin (JANUVIA) 100 MG tablet Take 50 mg by mouth Daily.     • [DISCONTINUED] furosemide (LASIX) 40 MG tablet Take 40 mg by mouth 2 (Two) Times a Day.     • [DISCONTINUED] metoprolol succinate XL (TOPROL-XL) 50 MG 24 hr tablet Take 50 mg by mouth 2 (Two) Times a Day.         Review of Systems:  Review of Systems   Constitution: Positive for weakness and malaise/fatigue. Negative for chills, decreased appetite and fever.   HENT: Negative for congestion and nosebleeds.    Eyes: Negative for blurred vision and double vision.   Cardiovascular: Positive for leg swelling. Negative for chest pain, irregular heartbeat, near-syncope, palpitations and syncope.   Respiratory: Positive for cough, shortness of breath and wheezing.    Endocrine: Negative for cold intolerance and heat intolerance.   Hematologic/Lymphatic: Bruises/bleeds easily.   Skin: Negative for dry skin, itching and rash (under L breast).   Musculoskeletal: Positive for arthritis, gout, joint pain (knees especially), muscle weakness (legs) and neck pain. Negative for back pain and muscle cramps.   Gastrointestinal: Positive for diarrhea. Negative for abdominal pain, constipation, heartburn, melena, nausea and vomiting.   Genitourinary: Negative for dysuria, frequency and hematuria.   Neurological: Positive for dizziness, headaches and loss of balance. Negative for light-headedness and numbness.   Psychiatric/Behavioral: Negative for depression. The patient has insomnia. The patient is not nervous/anxious.           Objective:   /90 (BP Location: Right arm, Patient Position: Sitting, Cuff Size: Adult)   Pulse 88   Resp 18   Ht 144.8 cm (57\")   Wt 69.4 kg (153 lb)   SpO2 90%   BMI 33.11 kg/m²   Wt Readings from Last 3 Encounters:   09/14/18 69.4 kg (153 lb)   03/05/18 66.2 kg (146 lb)   08/29/17 68 kg (150 lb)       Physical Exam   Constitutional: She is oriented to " person, place, and time. She appears well-developed and well-nourished.   obese   Eyes: No scleral icterus.   Neck: No JVD present.   Cardiovascular: Normal rate, regular rhythm, normal heart sounds and intact distal pulses.  Exam reveals no gallop and no friction rub.    No murmur heard.  Pulmonary/Chest: Effort normal. She has decreased breath sounds.   Abdominal:   obese   Musculoskeletal: She exhibits edema (1+ pitting BLEs (worse RLE)) and tenderness.   Neurological: She is alert and oriented to person, place, and time.   Skin: Skin is warm and dry.   Psychiatric: She has a normal mood and affect.   Vitals reviewed.    Lab/Diagnostics Review:   02/22/2018  CMP sodium 141, potassium 5.1, chloride 102, CO2 33, BUN 23, creatinine 1.2, calcium 10.0, glucose 128, total protein 7.6, albumin 3.7, total bilirubin 0.6, alkaline phosphatase 160, AST 13, ALT 16  Hemoglobin A1c 7.4%  TSH 3.91     02/04/2016  CBC WBC 8.9, Hgb 12.9, Hct 43.4, platelets 253,000  CMP sodium 142, chloride 103, potassium 6.1, CO2 34.1, calcium 9.3, glucose 122, alkaline phosphatase 183, AST 13, ALT 22, albumin 3.8, total protein 7.1  TSH 1.14  Lipid Panel , , HDL 43,   Mg 2.2, Hgb A1C 7.3%      01/13/2016 EKG sinus rhythm 61 beats, right axis deviation,nonspecific intraventricular block, non-specific T-wave abnormality, no previous for comparison      2/11/2015 Lipid Panel total cholesterol 205, triglyceride 255, HDL 39,    10/15/2015 TSH 1.12      04/13/2015 PFTs mild obstruction, no significant bronchodilator response, and normal lung volumes, increased airway resistance, moderately reduced diffusion capacity.       07/31/2013 Lexiscan sestamibi small apical defect consistent with ischemia, EF 95%     ECG 12 Lead  Date/Time: 3/5/2018 1:13 PM  Performed by: SASHA NAVAS  Authorized by: SASHA NAVAS   Comparison: compared with previous ECG from 1/13/2016  Comparison to previous ECG: Nonspecific  intraventricular block replaced by R BBB  Rhythm: sinus rhythm  Rhythm comments: with sinus arrhythmia  Rate: normal  BPM: 68  Conduction: right bundle branch block  Clinical impression: abnormal ECG        ECG 12 Lead  Date/Time: 9/14/2018 2:04 PM  Performed by: SASHA NAVAS  Authorized by: SASHA NAVAS   Comparison: compared with previous ECG from 3/5/2018  Comparison to previous ECG: Inferolateral ST/T wave depressions have worsened, possible septal infarct  Rhythm: sinus rhythm  Rate: normal  BPM: 89  Conduction: right bundle branch block  Other findings: LAE  Clinical impression: abnormal ECG                Assessment/Plan:         Problem List Items Addressed This Visit        Cardiovascular and Mediastinum    Atherosclerosis of native coronary artery of native heart without angina pectoris - Primary (Chronic)    Current Assessment & Plan     Stable without angina. Continue medications.          Relevant Medications    metoprolol succinate XL (TOPROL-XL) 50 MG 24 hr tablet    Other Relevant Orders    ECG 12 Lead    Hypertension (Chronic)    Overview     Essential benign         Current Assessment & Plan     Not controlled per today's or home readings. Increase Toprol.         Relevant Medications    metoprolol succinate XL (TOPROL-XL) 50 MG 24 hr tablet    furosemide (LASIX) 40 MG tablet    Mixed hyperlipidemia (Chronic)       Respiratory    SOB (shortness of breath) (Chronic)    Current Assessment & Plan     Check echocardiogram and BNP. Worrisome for CHF. Increase furosemide to 40 mg 2 tabs in am and 1 tab in early afternoon for next 3 days. Call if no improvement.          Relevant Medications    furosemide (LASIX) 40 MG tablet    Other Relevant Orders    proBNP    Adult Transthoracic Echo Complete W/ Cont if Necessary Per Protocol       Digestive    Class 1 obesity with body mass index (BMI) of 31.0 to 31.9 in adult (Chronic)       Other    Statin intolerance (Chronic)    Current Assessment  & Plan     And allergic reaction to Repatha. Declines trial of Praluent.          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           GIO Sanchez, ACNP-BC, CHFN-BC

## 2018-09-14 NOTE — TELEPHONE ENCOUNTER
[facility] in [place] called and wanted to make sure the metoprolol was to be extend release 3 times a day

## 2018-09-27 NOTE — TELEPHONE ENCOUNTER
----- Message from GIO Scott sent at 9/21/2018  4:09 PM CDT -----  Please let patient know that her pumping function was normal on her echo. Has she had her BNP drawn yet? Is she feeling better with increase of Lasix?

## 2018-09-27 NOTE — TELEPHONE ENCOUNTER
----- Message from GIO Scott sent at 9/21/2018  4:09 PM CDT -----  Please let patient know that her pumping function was normal on her echo. Has she had her BNP drawn yet? Is she feeling better with increase of Lasix?      PT CALLED INFORMED HER OF ECHO RESULTS SHE STATED SHE HAD BLOOD WORK DONE AND THE ONLY PROBLEM SHE WAS HAVING WAS WITH ARTHRITIS

## 2018-10-12 PROBLEM — I50.32 CHRONIC DIASTOLIC HEART FAILURE (HCC): Status: ACTIVE | Noted: 2018-01-01

## 2018-10-12 PROBLEM — G47.34 NOCTURNAL HYPOXIA: Chronic | Status: ACTIVE | Noted: 2018-01-01

## 2018-10-12 PROBLEM — N18.9 CHRONIC KIDNEY DISEASE: Chronic | Status: ACTIVE | Noted: 2018-01-01

## 2018-10-12 NOTE — PROGRESS NOTES
"    Subjective:     Encounter Date:10/12/2018    Chief Complaint:    Patient ID: Mis Fontaine is a 83 y.o. female here today for 1 month cardiac follow-up. She is accompanied by her daughter.     HPI     Coronary Artery Disease    Additional comments: no chest discomfort, Hx CABG. Swelling has improved but still worse in RLE (hx SVG). Has lost 7 lbs since increasing furosemide for 3 days last month.           Hypertension    Additional comments: is to start a new medication from Dr. Zavala - doesn't know name           Shortness of Breath    Additional comments: EF 55-60%, grade I diastolic dysfunction, mild MR, mild to moderate TR, mild PI per 9/2018 echo.  BNP done last month (but results not rec'd) Only wearing oxygen as needed. Doesn't want to wear at night despite sat's dropping to 50% - states \"it's a good way to die\"       Last edited by Tracey Feliciano APRN on 10/12/2018  5:37 PM. (History)        History:   Past Medical History:   Diagnosis Date   • Atherosclerosis of native coronary artery of native heart without angina pectoris    • Cardiac murmur, previously undiagnosed    • Chest pain     Unspecified   • Chronic kidney disease    • Diabetes mellitus (CMS/HCC)    • Fatigue     Unspecified   • GERD (gastroesophageal reflux disease)    • Gout    • Hyperlipidemia     Unspecified hyperlipidemia type   • Hypertension     Essential benign   • Hypothyroidism    • Nocturnal hypoxia    • Osteoarthritis    • Osteoporosis    • Pneumonia    • SOB (shortness of breath)    • Statin intolerance      Past Surgical History:   Procedure Laterality Date   • CAROTID ENDARTERECTOMY     • CAROTID STENT  12/22/2015     L carotid stents x 2 - Consuelo, RX Acculink 7mm x 30 mm   • CARPAL TUNNEL RELEASE      Both   • CATARACT EXTRACTION     • CERVICAL FUSION     • CORONARY ARTERY BYPASS GRAFT  10/24/2013    S/P LIMA to LAD, SVT to OM, SVG to distal RCA, Dr. Mccray, [facility]   • KNEE ARTHROSCOPY      Right   • LUMBAR FUSION     • " TUBAL ABDOMINAL LIGATION       Social History     Social History   • Marital status:      Spouse name: N/A   • Number of children: N/A   • Years of education: N/A     Occupational History   • Not on file.     Social History Main Topics   • Smoking status: Former Smoker     Packs/day: 1.50     Years: 30.00     Types: Cigarettes     Start date: 1960     Quit date: 2/16/1992   • Smokeless tobacco: Never Used   • Alcohol use No   • Drug use: No   • Sexual activity: Not Currently     Partners: Male     Other Topics Concern   • Not on file     Social History Narrative   • No narrative on file     Family History   Problem Relation Age of Onset   • Coronary artery disease Father    • Hypertension Father    • Stroke Father         Completed Stroke   • Asthma Other    • Thyroid disease Other    • Hyperlipidemia Other        Outpatient Prescriptions Marked as Taking for the 10/12/18 encounter (Office Visit) with Tracey Feliciano APRN   Medication Sig Dispense Refill   • acetaminophen-codeine (TYLENOL #3) 300-30 MG per tablet Take 1 tablet by mouth Every 4 (Four) Hours As Needed for Moderate Pain .     • amLODIPine (NORVASC) 5 MG tablet Take 5 mg by mouth Daily.     • aspirin 81 MG chewable tablet Chew 81 mg Daily.     • budesonide-formoterol (SYMBICORT) 160-4.5 MCG/ACT inhaler Inhale 2 puffs 2 (Two) Times a Day.     • clopidogrel (PLAVIX) 75 MG tablet Take 75 mg by mouth Daily.     • furosemide (LASIX) 40 MG tablet Take 1-2 tablets by mouth 2 (Two) Times a Day. (Patient taking differently: Take 40 mg by mouth 2 (Two) Times a Day.) 120 tablet 11   • glipiZIDE (GLUCOTROL) 5 MG tablet Take 5 mg by mouth Daily.     • levothyroxine (SYNTHROID, LEVOTHROID) 150 MCG tablet Take 150 mcg by mouth Daily.     • lisinopril (PRINIVIL,ZESTRIL) 20 MG tablet Take 1 tablet by mouth 2 (Two) Times a Day. 180 tablet 3   • magnesium oxide (MAG-OX) 400 MG tablet Take 400 mg by mouth Daily.     • metoprolol succinate XL (TOPROL-XL) 50 MG  24 hr tablet Take 1 tablet by mouth 3 (Three) Times a Day. 90 tablet 11   • nystatin (MYCOSTATIN) 469101 UNIT/GM powder Apply  topically 3 (Three) Times a Day. Until rash gone 30 g 0   • O2 (OXYGEN) Inhale 2 L/min Continuous.     • omeprazole OTC (PriLOSEC OTC) 20 MG EC tablet Take 20 mg by mouth Daily.     • Red Yeast Rice 500 MG/0.5GM powder red yeast rice     • SITagliptin (JANUVIA) 100 MG tablet Take 50 mg by mouth Daily.     • VITAMIN B1-B12 IJ Inject 1,000 Units as directed Every 28 (Twenty-Eight) Days.         Review of Systems:  Review of Systems   Constitution: Positive for weakness and malaise/fatigue. Negative for chills, decreased appetite and fever.   HENT: Positive for congestion. Negative for nosebleeds.    Eyes: Positive for blurred vision. Negative for double vision.   Cardiovascular: Positive for dyspnea on exertion and leg swelling (RIGHT ANKLE AND LEG ). Negative for chest pain, irregular heartbeat, near-syncope, palpitations and syncope.   Respiratory: Positive for cough, shortness of breath and wheezing.    Endocrine: Negative for cold intolerance and heat intolerance.   Hematologic/Lymphatic: Bruises/bleeds easily.   Skin: Positive for dry skin. Negative for itching and rash (under L breast).   Musculoskeletal: Positive for arthritis, back pain, gout, joint pain (knees especially) and joint swelling. Negative for falls, muscle cramps, muscle weakness (legs) and neck pain.   Gastrointestinal: Positive for diarrhea. Negative for abdominal pain, constipation, heartburn, melena, nausea and vomiting.   Genitourinary: Positive for frequency. Negative for dysuria and hematuria.   Neurological: Positive for excessive daytime sleepiness, loss of balance and numbness (LEFT HAND ). Negative for dizziness, headaches and light-headedness.   Psychiatric/Behavioral: Negative for depression. The patient has insomnia. The patient is not nervous/anxious.           Objective:   /60 (BP Location: Left arm,  "Patient Position: Sitting, Cuff Size: Adult)   Pulse 51   Ht 144.8 cm (57\")   Wt 65.4 kg (144 lb 3.2 oz)   SpO2 91%   BMI 31.20 kg/m²   Wt Readings from Last 3 Encounters:   10/12/18 65.4 kg (144 lb 3.2 oz)   09/14/18 69.4 kg (153 lb)   03/05/18 66.2 kg (146 lb)         Physical Exam   Constitutional: She is oriented to person, place, and time. She appears well-developed and well-nourished.   obese   Eyes: No scleral icterus.   Neck: No JVD present.   Cardiovascular: Normal rate, regular rhythm, normal heart sounds and intact distal pulses.  Exam reveals no gallop and no friction rub.    No murmur heard.  Pulmonary/Chest: Effort normal. She has decreased breath sounds.   Abdominal:   obese   Musculoskeletal: She exhibits edema (1+ pitting BLEs (worse RLE)) and tenderness.   Neurological: She is alert and oriented to person, place, and time.   Skin: Skin is warm and dry.   Psychiatric: She has a normal mood and affect.   Vitals reviewed.      Lab/Diagnostics Review:   10/10/2018  CBC WBC 8500, hemoglobin 16.1, hematocrit 53.7%, platelets 262,000  BMP sodium 140, potassium 3.8, chloride 100, CO2 34.7, BUN 26, creatinine 1.5, albumin 3.8, calcium 9.5, glucose 101, phosphorus 3.8, uric acid 14    09/18/2018 echocardiogram EF 55-60%, grade 1 diastolic dysfunction, mild MR, mild RVE, RVSP elevated at 40-45 mmHg, mild JILLIAN, mild to moderate TR and mild PI     9/14/2018 NT-proBNP 7596    02/22/2018  CMP sodium 141, potassium 5.1, chloride 102, CO2 33, BUN 23, creatinine 1.2, calcium 10.0, glucose 128, total protein 7.6, albumin 3.7, total bilirubin 0.6, alkaline phosphatase 160, AST 13, ALT 16  Hemoglobin A1c 7.4%  TSH 3.91     02/04/2016  CBC WBC 8.9, Hgb 12.9, Hct 43.4, platelets 253,000  CMP sodium 142, chloride 103, potassium 6.1, CO2 34.1, calcium 9.3, glucose 122, alkaline phosphatase 183, AST 13, ALT 22, albumin 3.8, total protein 7.1  TSH 1.14  Lipid Panel , , HDL 43,   Mg 2.2, Hgb " A1C 7.3%      01/13/2016 EKG sinus rhythm 61 beats, right axis deviation,nonspecific intraventricular block, non-specific T-wave abnormality, no previous for comparison      2/11/2015 Lipid Panel total cholesterol 205, triglyceride 255, HDL 39,    10/15/2015 TSH 1.12      04/13/2015 PFTs mild obstruction, no significant bronchodilator response, and normal lung volumes, increased airway resistance, moderately reduced diffusion capacity.       07/31/2013 Lexiscan sestamibi small apical defect consistent with ischemia, EF 95%      ECG 12 Lead  Date/Time: 3/5/2018 1:13 PM  Performed by: SASHA NAVAS  Authorized by: SASHA NAVAS   Comparison: compared with previous ECG from 1/13/2016  Comparison to previous ECG: Nonspecific intraventricular block replaced by R BBB  Rhythm: sinus rhythm  Rhythm comments: with sinus arrhythmia  Rate: normal  BPM: 68  Conduction: right bundle branch block  Clinical impression: abnormal ECG        ECG 12 Lead  Date/Time: 9/14/2018 2:04 PM  Performed by: SASHA NAVAS  Authorized by: SASHA NAVAS   Comparison: compared with previous ECG from 3/5/2018  Comparison to previous ECG: Inferolateral ST/T wave depressions have worsened, possible septal infarct  Rhythm: sinus rhythm  Rate: normal  BPM: 89  Conduction: right bundle branch block  Other findings: LAE  Clinical impression: abnormal ECG    Procedures: none in office today        Assessment/Plan:         Problem List Items Addressed This Visit        Cardiovascular and Mediastinum    Atherosclerosis of native coronary artery of native heart without angina pectoris - Primary (Chronic)    Current Assessment & Plan     Stable without angina. Continue present therapy. Statin intolerant. Site reaction to Repatha. Declines Praluent.           Hypertension (Chronic)    Current Assessment & Plan     Better controlled since medications increased.          Mixed hyperlipidemia (Chronic)    Current Assessment &  Plan     Uncontrolled, statin intolerant, site reaction to Repatha, declines Praluent - might consider bile acid sequestrant or non-statin meds         Chronic diastolic heart failure (CMS/HCC)    Current Assessment & Plan     Avoid processed foods. Doesn't add salt but eats canned foods. Take extra diuretic as needed. Wear compression socks/hose to help with lower extremity swelling. Call if increased shortness of breath, smothering when lying flat, or waking up short of breath at night. Call for free handbook at [phone]            Respiratory    SOB (shortness of breath) (Chronic)    Current Assessment & Plan     BNP elevated 9/2018 consistent with diastolic heart failure.             Digestive    Class 1 obesity with body mass index (BMI) of 31.0 to 31.9 in adult (Chronic)    Current Assessment & Plan     Improving but suspect related to diuresis, encouraged healthy diet and routine aerobic exercise to assist with further weight loss             Genitourinary    Chronic kidney disease (Chronic)    Current Assessment & Plan     Dr. Zavala following. Discussed caution with extra diuretic.            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           Tracey Feliciano, APRN, ACNP-BC, CHFN-BC

## 2018-10-12 NOTE — ASSESSMENT & PLAN NOTE
Uncontrolled, statin intolerant, site reaction to Repatha, declines Praluent - might consider bile acid sequestrant or non-statin meds

## 2018-10-12 NOTE — ASSESSMENT & PLAN NOTE
Improving but suspect related to diuresis, encouraged healthy diet and routine aerobic exercise to assist with further weight loss

## 2018-10-12 NOTE — ASSESSMENT & PLAN NOTE
Stable without angina. Continue present therapy. Statin intolerant. Site reaction to Repatha. Declines Praluent.

## 2018-10-12 NOTE — ASSESSMENT & PLAN NOTE
Avoid processed foods. Doesn't add salt but eats canned foods. Take extra diuretic as needed. Wear compression socks/hose to help with lower extremity swelling. Call if increased shortness of breath, smothering when lying flat, or waking up short of breath at night. Call for free handbook at [phone]

## 2018-10-12 NOTE — PATIENT INSTRUCTIONS
Heart Failure  Heart failure is a condition in which the heart has trouble pumping blood because it has become weak or stiff. This means that the heart does not pump blood efficiently for the body to work well. For some people with heart failure, fluid may back up into the lungs and there may be swelling (edema) in the lower legs. Heart failure is usually a long-term (chronic) condition. It is important for you to take good care of yourself and follow the treatment plan from your health care provider.  What are the causes?  This condition is caused by some health problems, including:  · High blood pressure (hypertension). Hypertension causes the heart muscle to work harder than normal. High blood pressure eventually causes the heart to become stiff and weak.  · Coronary artery disease (CAD). CAD is the buildup of cholesterol and fat (plaques) in the arteries of the heart.  · Heart attack (myocardial infarction). Injured tissue, which is caused by the heart attack, does not contract as well and the heart's ability to pump blood is weakened.  · Abnormal heart valves. When the heart valves do not open and close properly, the heart muscle must pump harder to keep the blood flowing.  · Heart muscle disease (cardiomyopathy or myocarditis). Heart muscle disease is damage to the heart muscle from a variety of causes, such as drug or alcohol abuse, infections, or unknown causes. These can increase the risk of heart failure.  · Lung disease. When the lungs do not work properly, the heart must work harder.    What increases the risk?  Risk of heart failure increases as a person ages. This condition is also more likely to develop in people who:  · Are overweight.  · Are male.  · Smoke or chew tobacco.  · Abuse alcohol or illegal drugs.  · Have taken medicines that can damage the heart, such as chemotherapy drugs.  · Have diabetes.  ? High blood sugar (glucose) is associated with high fat (lipid) levels in the blood.  ? Diabetes  can also damage tiny blood vessels that carry nutrients to the heart muscle.  · Have abnormal heart rhythms.  · Have thyroid problems.  · Have low blood counts (anemia).    What are the signs or symptoms?  Symptoms of this condition include:  · Shortness of breath with activity, such as when climbing stairs.  · Persistent cough.  · Swelling of the feet, ankles, legs, or abdomen.  · Unexplained weight gain.  · Difficulty breathing when lying flat (orthopnea).  · Waking from sleep because of the need to sit up and get more air.  · Rapid heartbeat.  · Fatigue and loss of energy.  · Feeling light-headed, dizzy, or close to fainting.  · Loss of appetite.  · Nausea.  · Increased urination during the night (nocturia).  · Confusion.    How is this diagnosed?  This condition is diagnosed based on:  · Medical history, symptoms, and a physical exam.  · Diagnostic tests, which may include:  ? Echocardiogram.  ? Electrocardiogram (ECG).  ? Chest X-ray.  ? Blood tests.  ? Exercise stress test.  ? Radionuclide scans.  ? Cardiac catheterization and angiogram.    How is this treated?  Treatment for this condition is aimed at managing the symptoms of heart failure. Medicines, behavioral changes, or other treatments may be necessary to treat heart failure.  Medicines  These may include:  · Angiotensin-converting enzyme (ACE) inhibitors. This type of medicine blocks the effects of a blood protein called angiotensin-converting enzyme. ACE inhibitors relax (dilate) the blood vessels and help to lower blood pressure.  · Angiotensin receptor blockers (ARBs). This type of medicine blocks the actions of a blood protein called angiotensin. ARBs dilate the blood vessels and help to lower blood pressure.  · Water pills (diuretics). Diuretics cause the kidneys to remove salt and water from the blood. The extra fluid is removed through urination, leaving a lower volume of blood that the heart has to pump.  · Beta blockers. These improve heart  muscle strength and they prevent the heart from beating too quickly.  · Digoxin. This increases the force of the heartbeat.    Healthy behavior changes  These may include:  · Reaching and maintaining a healthy weight.  · Stopping smoking or chewing tobacco.  · Eating heart-healthy foods.  · Limiting or avoiding alcohol.  · Stopping use of street drugs (illegal drugs).  · Physical activity.    Other treatments  These may include:  · Surgery to open blocked coronary arteries or repair damaged heart valves.  · Placement of a biventricular pacemaker to improve heart muscle function (cardiac resynchronization therapy). This device paces both the right ventricle and left ventricle.  · Placement of a device to treat serious abnormal heart rhythms (implantable cardioverter defibrillator, or ICD).  · Placement of a device to improve the pumping ability of the heart (left ventricular assist device, or LVAD).  · Heart transplant. This can cure heart failure, and it is considered for certain patients who do not improve with other therapies.    Follow these instructions at home:  Medicines  · Take over-the-counter and prescription medicines only as told by your health care provider. Medicines are important in reducing the workload of your heart, slowing the progression of heart failure, and improving your symptoms.  ? Do not stop taking your medicine unless your health care provider told you to do that.  ? Do not skip any dose of medicine.  ? Refill your prescriptions before you run out of medicine. You need your medicines every day.  Eating and drinking    · Eat heart-healthy foods. Talk with a dietitian to make an eating plan that is right for you.  ? Choose foods that contain no trans fat and are low in saturated fat and cholesterol. Healthy choices include fresh or frozen fruits and vegetables, fish, lean meats, legumes, fat-free or low-fat dairy products, and whole-grain or high-fiber foods.  ? Limit salt (sodium) if  directed by your health care provider. Sodium restriction may reduce symptoms of heart failure. Ask a dietitian to recommend heart-healthy seasonings.  ? Use healthy cooking methods instead of frying. Healthy methods include roasting, grilling, broiling, baking, poaching, steaming, and stir-frying.  · Limit your fluid intake if directed by your health care provider. Fluid restriction may reduce symptoms of heart failure.  Lifestyle  · Stop smoking or using chewing tobacco. Nicotine and tobacco can damage your heart and your blood vessels. Do not use nicotine gum or patches before talking to your health care provider.  · Limit alcohol intake to no more than 1 drink per day for non-pregnant women and 2 drinks per day for men. One drink equals 12 oz of beer, 5 oz of wine, or 1½ oz of hard liquor.  ? Drinking more than that is harmful to your heart. Tell your health care provider if you drink alcohol several times a week.  ? Talk with your health care provider about whether any level of alcohol use is safe for you.  ? If your heart has already been damaged by alcohol or you have severe heart failure, drinking alcohol should be stopped completely.  · Stop use of illegal drugs.  · Lose weight if directed by your health care provider. Weight loss may reduce symptoms of heart failure.  · Do moderate physical activity if directed by your health care provider. People who are elderly and people with severe heart failure should consult with a health care provider for physical activity recommendations.  Monitor important information  · Weigh yourself every day. Keeping track of your weight daily helps you to notice excess fluid sooner.  ? Weigh yourself every morning after you urinate and before you eat breakfast.  ? Wear the same amount of clothing each time you weigh yourself.  ? Record your daily weight. Provide your health care provider with your weight record.  · Monitor and record your blood pressure as told by your health  care provider.  · Check your pulse as told by your health care provider.  Dealing with extreme temperatures  · If the weather is extremely hot:  ? Avoid vigorous physical activity.  ? Use air conditioning or fans or seek a cooler location.  ? Avoid caffeine and alcohol.  ? Wear loose-fitting, lightweight, and light-colored clothing.  · If the weather is extremely cold:  ? Avoid vigorous physical activity.  ? Layer your clothes.  ? Wear mittens or gloves, a hat, and a scarf when you go outside.  ? Avoid alcohol.  General instructions  · Manage other health conditions such as hypertension, diabetes, thyroid disease, or abnormal heart rhythms as told by your health care provider.  · Learn to manage stress. If you need help to do this, ask your health care provider.  · Plan rest periods when fatigued.  · Get ongoing education and support as needed.  · Participate in or seek rehabilitation as needed to maintain or improve independence and quality of life.  · Stay up to date with immunizations. Keeping current on pneumococcal and influenza immunizations is especially important to prevent respiratory infections.  · Keep all follow-up visits as told by your health care provider. This is important.  Contact a health care provider if:  · You have a rapid weight gain.  · You have increasing shortness of breath that is unusual for you.  · You are unable to participate in your usual physical activities.  · You tire easily.  · You cough more than normal, especially with physical activity.  · You have any swelling or more swelling in areas such as your hands, feet, ankles, or abdomen.  · You are unable to sleep because it is hard to breathe.  · You feel like your heart is beating quickly (palpitations).  · You become dizzy or light-headed when you stand up.  Get help right away if:  · You have difficulty breathing.  · You notice or your family notices a change in your awareness, such as having trouble staying awake or having  difficulty with concentration.  · You have pain or discomfort in your chest.  · You have an episode of fainting (syncope).  This information is not intended to replace advice given to you by your health care provider. Make sure you discuss any questions you have with your health care provider.  Document Released: 12/18/2006 Document Revised: 08/22/2017 Document Reviewed: 07/12/2017  [employer] Patient Education © 2018 [employer]

## 2018-11-14 ENCOUNTER — OFFICE VISIT (OUTPATIENT)
Dept: NEUROSURGERY | Age: 83
End: 2018-11-14
Payer: MEDICARE

## 2018-11-14 VITALS
BODY MASS INDEX: 31.02 KG/M2 | HEART RATE: 64 BPM | DIASTOLIC BLOOD PRESSURE: 72 MMHG | WEIGHT: 143.8 LBS | HEIGHT: 57 IN | SYSTOLIC BLOOD PRESSURE: 147 MMHG

## 2018-11-14 DIAGNOSIS — R53.1 WEAKNESS: Primary | ICD-10-CM

## 2018-11-14 DIAGNOSIS — R53.1 WEAKNESS: ICD-10-CM

## 2018-11-14 LAB
C-REACTIVE PROTEIN: 9.02 MG/DL (ref 0–0.5)
RHEUMATOID FACTOR: 12 IU/ML
SEDIMENTATION RATE, ERYTHROCYTE: 13 MM/HR (ref 0–25)

## 2018-11-14 PROCEDURE — 99204 OFFICE O/P NEW MOD 45 MIN: CPT | Performed by: NURSE PRACTITIONER

## 2018-11-14 RX ORDER — ALLOPURINOL 300 MG/1
300 TABLET ORAL DAILY
COMMUNITY

## 2018-11-14 RX ORDER — FERROUS SULFATE 325(65) MG
325 TABLET ORAL DAILY
COMMUNITY

## 2018-11-14 RX ORDER — GLIPIZIDE 5 MG/1
5 TABLET, FILM COATED, EXTENDED RELEASE ORAL DAILY
COMMUNITY
End: 2019-01-10 | Stop reason: ALTCHOICE

## 2018-11-14 NOTE — PROGRESS NOTES
REVIEW OF SYSTEMS    Constitutional: []Fever []Sweat []Chills [] Recent Injury [x] Denies all unless marked  HEENT:[]Headache  [] Head Injury/Hearing Loss  [] Sore Throat  [] Ear Ache/Dizziness  [x] Denies all unless marked  Spine:  [x] Neck pain  [x] Back pain  [] Sciaticia  [x] Denies all unless marked  Cardiovascular:[x]Heart Disease []Chest Pain [] Palpitations  [x] Denies all unless marked  Pulmonary: [x]Shortness of Breath [x]Cough   [x] Denies all unless marke  Gastrointestinal: []Nausea  []Vomiting  []Abdominal Pain  []Constipation  []Diarrhea  []Dark Bloody Stools  [x] Denies all unless marked  Psychiatric/Behavioral:[] Depression [] Anxiety [x] Denies all unless marked  Genitourinary:   [] Frequency  [] Urgency  [] Incontinence [] Pain with Urination  [x] Denies all unless marked  Extremities: [x]Pain  [x]Swelling  [x] Denies all unless marked  Musculoskeletal: [] Muscle Pain  [] Joint Pain  [x] Arthritis [] Muscle Cramps [] Muscle Twitches  [x] Denies all unless marked  Sleep: [] Insomnia [] Snoring [] Restless Legs [] Sleep Apnea  [x] Daytime Sleepiness  [x] Denies all unless marked  Skin:[] Rash [] Skin Discoloration [x] Denies all unless marked   Neurological: []Visual Disturbance/Memory Loss [] Loss of Balance [] Slurred Speech/Weakness [] Seizures  [] Vertigo/Dizziness [x] Denies all unless marked
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
## 2018-11-15 DIAGNOSIS — R89.9 ABNORMAL LABORATORY TEST: Primary | ICD-10-CM

## 2018-11-17 LAB — ANA IGG, ELISA: NORMAL

## 2018-11-19 DIAGNOSIS — R89.9 ABNORMAL LABORATORY TEST: ICD-10-CM

## 2018-11-19 LAB
C-REACTIVE PROTEIN: 6.58 MG/DL (ref 0–0.5)
SEDIMENTATION RATE, ERYTHROCYTE: 15 MM/HR (ref 0–25)

## 2018-11-20 DIAGNOSIS — R89.9 ABNORMAL LABORATORY TEST: Primary | ICD-10-CM

## 2018-11-20 PROBLEM — I50.32 CHRONIC DIASTOLIC HEART FAILURE (HCC): Chronic | Status: ACTIVE | Noted: 2018-01-01

## 2018-11-20 NOTE — ASSESSMENT & PLAN NOTE
Mild decompensation. Take extra diuretic today and as needed for weight gain, swelling. Cautioned about worsening renal function/dehydration if takes too much diuretic. Encouraged her to wear oxygen especially with exertion and at night.

## 2018-11-20 NOTE — ASSESSMENT & PLAN NOTE
Lori, discussed risks of stopping dual antiplatelets for spine injection. Defer whether to stop antiplatelets to her. Would prefer she remain on aspirin if possible.

## 2018-11-20 NOTE — ASSESSMENT & PLAN NOTE
Well controlled with possible orthostasis. Check BP when feels dizzy or lightheaded. No orthostatic change in BP in office today.

## 2018-11-20 NOTE — ASSESSMENT & PLAN NOTE
Now willing to try Praluent. Will have her recheck lipids as ordered 3/2018 for baseline and will order Praluent and re-educate on administration.

## 2018-11-20 NOTE — PROGRESS NOTES
Subjective:     Encounter Date:11/20/2018    Chief Complaint:    Patient ID: Mis Fontaine is a 83 y.o. female here today for 6 week cardiac follow-up. She is accompanied by her daughter Rachana.     HPI     Coronary Artery Disease      Additional comments: no chest discomfort, hx CABG.              Hyperlipidemia      Additional comments: doesn't know if ever got lipid panel drawn as ordered 3/2018, unable to tolerate statins or red yeast rice              Congestive Heart Failure      Additional comments: chronic diastolic, gained 2-3 lbs overnight, ate french fries yesterday, not constipated. Chronic orthopnea - HOB elevated a few inches. No PND. Nocturia. Swelling in LEs worsened. Only wore compression stockings one day and didn't think helped. Hasn't taken an extra diuretic. Not sleeping with oxgyen. Only using with exertion.              Hypertension      Additional comments: has been around 120-130/? at home          Last edited by Tracey Feliciano APRN on 11/20/2018 11:32 AM. (History)        History:   Past Medical History:   Diagnosis Date   • Atherosclerosis of native coronary artery of native heart without angina pectoris    • Cardiac murmur, previously undiagnosed    • Chest pain     Unspecified   • Chronic kidney disease    • Diabetes mellitus (CMS/[facility])    • Fatigue     Unspecified   • GERD (gastroesophageal reflux disease)    • Gout    • Hyperlipidemia     Unspecified hyperlipidemia type   • Hypertension     Essential benign   • Hypothyroidism    • Nocturnal hypoxia    • Osteoarthritis    • Osteoporosis    • Pneumonia    • SOB (shortness of breath)    • Statin intolerance      Past Surgical History:   Procedure Laterality Date   • CAROTID ENDARTERECTOMY     • CAROTID STENT  12/22/2015     L carotid stents x 2 - Consuelo, RX Acculink 7mm x 30 mm   • CARPAL TUNNEL RELEASE      Both   • CATARACT EXTRACTION     • CERVICAL FUSION     • CORONARY ARTERY BYPASS GRAFT  10/24/2013    S/P LIMA to LAD, SVT  to OM, SVG to distal RCA, Dr. Mccray, [facility]   • KNEE ARTHROSCOPY      Right   • LUMBAR FUSION     • TUBAL ABDOMINAL LIGATION       Social History     Socioeconomic History   • Marital status:      Spouse name: Not on file   • Number of children: Not on file   • Years of education: Not on file   • Highest education level: Not on file   Social Needs   • Financial resource strain: Not on file   • Food insecurity - worry: Not on file   • Food insecurity - inability: Not on file   • Transportation needs - medical: Not on file   • Transportation needs - non-medical: Not on file   Occupational History   • Not on file   Tobacco Use   • Smoking status: Former Smoker     Packs/day: 1.50     Years: 30.00     Pack years: 45.00     Types: Cigarettes     Start date:      Last attempt to quit: 1992     Years since quittin.7   • Smokeless tobacco: Never Used   Substance and Sexual Activity   • Alcohol use: No   • Drug use: No   • Sexual activity: Not Currently     Partners: Male   Other Topics Concern   • Not on file   Social History Narrative   • Not on file     Family History   Problem Relation Age of Onset   • Coronary artery disease Father    • Hypertension Father    • Stroke Father         Completed Stroke   • Asthma Other    • Thyroid disease Other    • Hyperlipidemia Other        Outpatient Medications Marked as Taking for the 18 encounter (Office Visit) with Tracey Feliciano APRN   Medication Sig Dispense Refill   • Acetaminophen (TYLENOL 8 HOUR ARTHRITIS PAIN PO) Take 1-2 tablets by mouth 2 (Two) Times a Day.     • allopurinol (ZYLOPRIM) 300 MG tablet Take 300 mg by mouth Daily.     • amLODIPine (NORVASC) 5 MG tablet Take 5 mg by mouth Daily.     • aspirin 81 MG chewable tablet Chew 81 mg Daily.     • budesonide-formoterol (SYMBICORT) 160-4.5 MCG/ACT inhaler Inhale 2 puffs 2 (Two) Times a Day.     • clopidogrel (PLAVIX) 75 MG tablet Take 75 mg by mouth Daily.     • ferrous sulfate 325 (65 FE) MG  tablet Take 325 mg by mouth Daily With Breakfast.     • furosemide (LASIX) 40 MG tablet Take 1-2 tablets by mouth 2 (Two) Times a Day. (Patient taking differently: Take 40 mg by mouth 2 (Two) Times a Day.) 120 tablet 11   • gabapentin (NEURONTIN) 100 MG capsule Take 100 mg by mouth Daily.     • glipiZIDE (GLUCOTROL) 5 MG tablet Take 5 mg by mouth Daily.     • levothyroxine (SYNTHROID, LEVOTHROID) 150 MCG tablet Take 150 mcg by mouth Daily.     • lisinopril (PRINIVIL,ZESTRIL) 20 MG tablet Take 1 tablet by mouth 2 (Two) Times a Day. 180 tablet 3   • magnesium oxide (MAG-OX) 400 MG tablet Take 400 mg by mouth Daily.     • metoprolol succinate XL (TOPROL-XL) 50 MG 24 hr tablet Take 1 tablet by mouth 3 (Three) Times a Day. 90 tablet 11   • O2 (OXYGEN) Inhale 2 L/min Continuous.     • Omega-3 Fatty Acids (OMEGA-3 FISH OIL) 1000 MG capsule Take  by mouth.     • omeprazole OTC (PriLOSEC OTC) 20 MG EC tablet Take 20 mg by mouth Daily.     • SITagliptin (JANUVIA) 50 MG tablet Take 50 mg by mouth Daily.         Review of Systems:  Review of Systems   Constitution: Positive for chills, decreased appetite, weakness and malaise/fatigue. Negative for fever.   HENT: Negative for congestion and nosebleeds.    Eyes: Positive for blurred vision. Negative for double vision.   Cardiovascular: Positive for chest pain (occ ), dyspnea on exertion and leg swelling (both lower legs). Negative for irregular heartbeat, near-syncope, palpitations and syncope.   Respiratory: Positive for cough and shortness of breath. Negative for wheezing.    Endocrine: Negative for cold intolerance and heat intolerance.   Hematologic/Lymphatic: Bruises/bleeds easily.   Skin: Positive for dry skin. Negative for itching and rash (under L breast).   Musculoskeletal: Positive for arthritis, back pain, falls (11/20/2018 ), gout, joint pain (knees especially), joint swelling, muscle cramps, neck pain and stiffness. Negative for muscle weakness (legs).  "  Gastrointestinal: Negative for abdominal pain, constipation, diarrhea, heartburn, melena, nausea and vomiting.   Genitourinary: Positive for frequency and nocturia (3 to 4 times a night ). Negative for dysuria and hematuria.   Neurological: Positive for excessive daytime sleepiness, light-headedness (occ ), loss of balance and numbness (legs ,and left hand and fingers). Negative for dizziness and headaches.   Psychiatric/Behavioral: Negative for depression. The patient has insomnia. The patient is not nervous/anxious.             Objective:   /64 (BP Location: Left arm, Patient Position: Sitting, Cuff Size: Adult)   Pulse 51   Ht 144.8 cm (57\")   Wt 66 kg (145 lb 9.6 oz)   SpO2 (!) 87% Comment: 2 litters of o2 at home  BMI 31.51 kg/m²   Wt Readings from Last 3 Encounters:   11/20/18 66 kg (145 lb 9.6 oz)   10/12/18 65.4 kg (144 lb 3.2 oz)   09/14/18 69.4 kg (153 lb)     Did not wear oxygen to visit.    BP sitting 102/50  BP standing 100/48      Physical Exam   Constitutional: She is oriented to person, place, and time. She appears well-developed and well-nourished.   obese   Eyes: No scleral icterus.   Neck: No JVD present.   Cardiovascular: Normal rate, regular rhythm, normal heart sounds and intact distal pulses. Exam reveals no gallop and no friction rub.   No murmur heard.  Pulmonary/Chest: Effort normal. She has decreased breath sounds.   Abdominal:   obese   Musculoskeletal: She exhibits edema (1+ pitting BLEs (worse RLE)) and tenderness.   Neurological: She is alert and oriented to person, place, and time.   Skin: Skin is warm and dry.   Psychiatric: She has a normal mood and affect.   Vitals reviewed.      Lab/Diagnostics Review:   10/10/2018  CBC WBC 8500, hemoglobin 16.1, hematocrit 53.7%, platelets 262,000  BMP sodium 140, potassium 3.8, chloride 100, CO2 34.7, BUN 26, creatinine 1.5, albumin 3.8, calcium 9.5, glucose 101, phosphorus 3.8, uric acid 14     09/18/2018 echocardiogram EF 55-60%, " grade 1 diastolic dysfunction, mild MR, mild RVE, RVSP elevated at 40-45 mmHg, mild JILLIAN, mild to moderate TR and mild PI      9/14/2018 NT-proBNP 7596     02/22/2018  CMP sodium 141, potassium 5.1, chloride 102, CO2 33, BUN 23, creatinine 1.2, calcium 10.0, glucose 128, total protein 7.6, albumin 3.7, total bilirubin 0.6, alkaline phosphatase 160, AST 13, ALT 16  Hemoglobin A1c 7.4%  TSH 3.91     02/04/2016  CBC WBC 8.9, Hgb 12.9, Hct 43.4, platelets 253,000  CMP sodium 142, chloride 103, potassium 6.1, CO2 34.1, calcium 9.3, glucose 122, alkaline phosphatase 183, AST 13, ALT 22, albumin 3.8, total protein 7.1  TSH 1.14  Lipid Panel , , HDL 43,   Mg 2.2, Hgb A1C 7.3%      01/13/2016 EKG sinus rhythm 61 beats, right axis deviation,nonspecific intraventricular block, non-specific T-wave abnormality, no previous for comparison      2/11/2015 Lipid Panel total cholesterol 205, triglyceride 255, HDL 39,    10/15/2015 TSH 1.12      04/13/2015 PFTs mild obstruction, no significant bronchodilator response, and normal lung volumes, increased airway resistance, moderately reduced diffusion capacity.       07/31/2013 Lexiscan sestamibi small apical defect consistent with ischemia, EF 95%      ECG 12 Lead  Date/Time: 3/5/2018 1:13 PM  Performed by: SASHA NAVAS  Authorized by: SASHA NAVAS   Comparison: compared with previous ECG from 1/13/2016  Comparison to previous ECG: Nonspecific intraventricular block replaced by R BBB  Rhythm: sinus rhythm  Rhythm comments: with sinus arrhythmia  Rate: normal  BPM: 68  Conduction: right bundle branch block  Clinical impression: abnormal ECG         ECG 12 Lead  Date/Time: 9/14/2018 2:04 PM  Performed by: SASHA NAVAS  Authorized by: SASHA NAVAS   Comparison: compared with previous ECG from 3/5/2018  Comparison to previous ECG: Inferolateral ST/T wave depressions have worsened, possible septal infarct  Rhythm: sinus rhythm  Rate:  normal  BPM: 89  Conduction: right bundle branch block  Other findings: LAE  Clinical impression: abnormal ECG    Procedures: none in office        Assessment/Plan:         Problem List Items Addressed This Visit        Cardiovascular and Mediastinum    Atherosclerosis of native coronary artery of native heart without angina pectoris - Primary (Chronic)    Current Assessment & Plan     Stable, discussed risks of stopping dual antiplatelets for spine injection. Defer whether to stop antiplatelets to her. Would prefer she remain on aspirin if possible.          Hypertension (Chronic)    Current Assessment & Plan     Well controlled with possible orthostasis. Check BP when feels dizzy or lightheaded. No orthostatic change in BP in office today.         Mixed hyperlipidemia (Chronic)    Current Assessment & Plan     Now willing to try Praluent. Will have her recheck lipids as ordered 3/2018 for baseline and will order Praluent and re-educate on administration.          Chronic diastolic heart failure (CMS/HCC) (Chronic)    Current Assessment & Plan     Mild decompensation. Take extra diuretic today and as needed for weight gain, swelling. Cautioned about worsening renal function/dehydration if takes too much diuretic. Encouraged her to wear oxygen especially with exertion and at night.            Respiratory    Nocturnal hypoxia (Chronic)    Current Assessment & Plan     Encouraged her to wear oxygen at night. Declines CPAP.            Digestive    Class 1 obesity with body mass index (BMI) of 31.0 to 31.9 in adult (Chronic)    Current Assessment & Plan     Encouraged healthy diet and gradual increase in aerobic activity for weight loss.             Genitourinary    Chronic kidney disease (Chronic)    Current Assessment & Plan     Dr. Zavala following. Stable 10/2018 labs.            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           GIO Sanchez, ACNP-BC, CHFN-BC

## 2018-11-21 DIAGNOSIS — M79.604 PAIN IN BOTH LOWER EXTREMITIES: Primary | ICD-10-CM

## 2018-11-21 DIAGNOSIS — M25.50 ARTHRALGIA, UNSPECIFIED JOINT: ICD-10-CM

## 2018-11-21 DIAGNOSIS — M79.605 PAIN IN BOTH LOWER EXTREMITIES: Primary | ICD-10-CM

## 2019-01-01 ENCOUNTER — TELEPHONE (OUTPATIENT)
Dept: CARDIOLOGY | Facility: CLINIC | Age: 84
End: 2019-01-01

## 2019-01-01 ENCOUNTER — OFFICE VISIT (OUTPATIENT)
Dept: CARDIOLOGY | Facility: CLINIC | Age: 84
End: 2019-01-01

## 2019-01-01 ENCOUNTER — RESULTS ENCOUNTER (OUTPATIENT)
Dept: CARDIOLOGY | Facility: CLINIC | Age: 84
End: 2019-01-01

## 2019-01-01 VITALS
HEART RATE: 51 BPM | OXYGEN SATURATION: 96 % | HEIGHT: 57 IN | DIASTOLIC BLOOD PRESSURE: 74 MMHG | BODY MASS INDEX: 31.5 KG/M2 | WEIGHT: 146 LBS | SYSTOLIC BLOOD PRESSURE: 132 MMHG

## 2019-01-01 VITALS
DIASTOLIC BLOOD PRESSURE: 60 MMHG | WEIGHT: 142.2 LBS | BODY MASS INDEX: 30.68 KG/M2 | SYSTOLIC BLOOD PRESSURE: 100 MMHG | OXYGEN SATURATION: 96 % | HEART RATE: 57 BPM | HEIGHT: 57 IN

## 2019-01-01 DIAGNOSIS — R42 DIZZINESS: ICD-10-CM

## 2019-01-01 DIAGNOSIS — I50.32 CHRONIC DIASTOLIC HEART FAILURE (HCC): Chronic | ICD-10-CM

## 2019-01-01 DIAGNOSIS — I25.10 ATHEROSCLEROSIS OF NATIVE CORONARY ARTERY OF NATIVE HEART WITHOUT ANGINA PECTORIS: Chronic | ICD-10-CM

## 2019-01-01 DIAGNOSIS — E87.5 HYPERKALEMIA: ICD-10-CM

## 2019-01-01 DIAGNOSIS — Z95.1 HX OF CABG: Chronic | ICD-10-CM

## 2019-01-01 DIAGNOSIS — I10 ESSENTIAL HYPERTENSION: Chronic | ICD-10-CM

## 2019-01-01 DIAGNOSIS — I50.32 CHRONIC DIASTOLIC HEART FAILURE (HCC): Primary | Chronic | ICD-10-CM

## 2019-01-01 DIAGNOSIS — R00.1 BRADYCARDIA: ICD-10-CM

## 2019-01-01 DIAGNOSIS — E78.2 MIXED HYPERLIPIDEMIA: Chronic | ICD-10-CM

## 2019-01-01 DIAGNOSIS — Z78.9 STATIN INTOLERANCE: Chronic | ICD-10-CM

## 2019-01-01 DIAGNOSIS — G47.34 NOCTURNAL HYPOXIA: Chronic | ICD-10-CM

## 2019-01-01 DIAGNOSIS — F17.201 MILD TOBACCO ABUSE IN SUSTAINED REMISSION: Chronic | ICD-10-CM

## 2019-01-01 DIAGNOSIS — E66.09 CLASS 1 OBESITY DUE TO EXCESS CALORIES WITH SERIOUS COMORBIDITY AND BODY MASS INDEX (BMI) OF 31.0 TO 31.9 IN ADULT: Chronic | ICD-10-CM

## 2019-01-01 DIAGNOSIS — N18.30 STAGE 3 CHRONIC KIDNEY DISEASE (HCC): Chronic | ICD-10-CM

## 2019-01-01 DIAGNOSIS — R00.1 BRADYCARDIA: Primary | ICD-10-CM

## 2019-01-01 PROCEDURE — 99214 OFFICE O/P EST MOD 30 MIN: CPT | Performed by: NURSE PRACTITIONER

## 2019-01-01 RX ORDER — ALBUTEROL SULFATE 2.5 MG/3ML
2.5 SOLUTION RESPIRATORY (INHALATION) 3 TIMES DAILY
COMMUNITY

## 2019-01-01 RX ORDER — VALSARTAN 80 MG/1
80 TABLET ORAL DAILY
Qty: 30 TABLET | Refills: 11 | OUTPATIENT
Start: 2019-01-01

## 2019-01-01 RX ORDER — NORTRIPTYLINE HYDROCHLORIDE 10 MG/1
10 CAPSULE ORAL NIGHTLY
COMMUNITY

## 2019-01-01 RX ORDER — VALSARTAN 80 MG/1
80 TABLET ORAL DAILY
Qty: 30 TABLET | Refills: 11 | Status: SHIPPED | OUTPATIENT
Start: 2019-01-01

## 2019-01-01 RX ORDER — PREDNISONE 1 MG/1
5 TABLET ORAL DAILY
COMMUNITY

## 2019-01-02 DIAGNOSIS — R89.9 ABNORMAL LABORATORY TEST: ICD-10-CM

## 2019-01-02 LAB
C-REACTIVE PROTEIN: 13.28 MG/DL (ref 0–0.5)
SEDIMENTATION RATE, ERYTHROCYTE: 11 MM/HR (ref 0–25)

## 2019-01-02 NOTE — TELEPHONE ENCOUNTER
[phone] [facility] @ Dr. Estrada's office - pt reported to them that you did not want her stopping Plavix so she can do an injection for her back.  Dr. Estrada questioning if you would be ok with bridging her to Lovenox so she can get injection due to increased pain.

## 2019-01-04 NOTE — TELEPHONE ENCOUNTER
ROCAEL WITH [facility] CALLED AND STATED THAT MRS ARNDT HEART RATE WHEN SITTING IS 60 AND WHEN STANDING OR MOVING WILL DROP TO 48 SHE STATED HER BP /67 AND HER FEET AND LEGS WERE 2 + EDEMA AND LEGS WERE HURTING A LITTLE BUT NO OTHER SYMPTOMS THEY WANTED TO KNOW IF MEDICATION NEEDED TO BE ADJUSTED      [facility] [id number]

## 2019-01-04 NOTE — TELEPHONE ENCOUNTER
She can stop Plavix for injections. I would prefer she at least stay on aspirin if possible given history of CABG and stents. I routed a copy of my last office note to Dr. Estrada but you can call them back and let them know what I discussed with patient at last office visit and my recommendations. Bridging with Lovenox might give her some protection. I recommend 1 mg/kg SQ (up to a max of 100 mg) every 12 hours. Hold the morning of the procedure and restart aspirin and Plavix when safe to do so. Usually hold aspirin and Plavix 5 to 7 days prior. TX!

## 2019-01-04 NOTE — TELEPHONE ENCOUNTER
CALLED SPOKE TO PT'S DAUGHTER INFORMED HER THAT PER SASHA NAVAS PT COULD TAKE 40MG OF LASIX IN THE AM AND 80 MG IN THE PM AND IF PT WAS ON INCREASED LASIX FOR MORE THAN 3 DAYS PT WOULD NEED A BMP ALSO INFORMED HER THAT A 24HR HOLTER MONITOR WAS ORDERED PT WANTS THIS TO BE DONE AT [facility]

## 2019-01-04 NOTE — TELEPHONE ENCOUNTER
She can take 40-80 mg of furosemide BID for increased swelling but if stays on higher dose for more than 3 days in a row check BMP.  I'll order a 24 Hr Holter monitor to further evaluate HR - unusual that it would be slower with standing or exertion... Make sure she completes a diary. Can get heart monitor at [facility] if more convenient.

## 2019-01-07 ENCOUNTER — HOSPITAL ENCOUNTER (OUTPATIENT)
Dept: GENERAL RADIOLOGY | Age: 84
Discharge: HOME | End: 2019-01-07
Payer: MEDICARE

## 2019-01-07 DIAGNOSIS — M1A.39X1 CHRONIC GOUT DUE TO RENAL IMPAIRMENT OF MULTIPLE SITES WITH TOPHUS: ICD-10-CM

## 2019-01-07 PROCEDURE — 73630 X-RAY EXAM OF FOOT: CPT

## 2019-01-10 ENCOUNTER — OFFICE VISIT (OUTPATIENT)
Dept: FAMILY MEDICINE CLINIC | Age: 84
End: 2019-01-10
Payer: MEDICARE

## 2019-01-10 VITALS
OXYGEN SATURATION: 90 % | DIASTOLIC BLOOD PRESSURE: 72 MMHG | BODY MASS INDEX: 30.73 KG/M2 | HEART RATE: 52 BPM | SYSTOLIC BLOOD PRESSURE: 122 MMHG | WEIGHT: 142 LBS | TEMPERATURE: 97.7 F

## 2019-01-10 DIAGNOSIS — I25.10 CORONARY ARTERY DISEASE INVOLVING NATIVE HEART WITHOUT ANGINA PECTORIS, UNSPECIFIED VESSEL OR LESION TYPE: ICD-10-CM

## 2019-01-10 DIAGNOSIS — N18.4 CKD STAGE 4 DUE TO TYPE 2 DIABETES MELLITUS (HCC): ICD-10-CM

## 2019-01-10 DIAGNOSIS — E11.42 TYPE 2 DIABETES MELLITUS WITH DIABETIC POLYNEUROPATHY, WITHOUT LONG-TERM CURRENT USE OF INSULIN (HCC): ICD-10-CM

## 2019-01-10 DIAGNOSIS — Z76.89 ENCOUNTER TO ESTABLISH CARE: Primary | ICD-10-CM

## 2019-01-10 DIAGNOSIS — R29.898 WEAKNESS OF BOTH LOWER EXTREMITIES: ICD-10-CM

## 2019-01-10 DIAGNOSIS — M79.605 PAIN IN BOTH LOWER EXTREMITIES: ICD-10-CM

## 2019-01-10 DIAGNOSIS — M79.604 PAIN IN BOTH LOWER EXTREMITIES: ICD-10-CM

## 2019-01-10 DIAGNOSIS — E11.22 CKD STAGE 4 DUE TO TYPE 2 DIABETES MELLITUS (HCC): ICD-10-CM

## 2019-01-10 DIAGNOSIS — Z23 NEED FOR VACCINATION WITH 13-POLYVALENT PNEUMOCOCCAL CONJUGATE VACCINE: ICD-10-CM

## 2019-01-10 DIAGNOSIS — R60.0 LOWER EXTREMITY EDEMA: ICD-10-CM

## 2019-01-10 DIAGNOSIS — E03.9 HYPOTHYROIDISM, UNSPECIFIED TYPE: ICD-10-CM

## 2019-01-10 DIAGNOSIS — I50.9 CONGESTIVE HEART FAILURE, UNSPECIFIED HF CHRONICITY, UNSPECIFIED HEART FAILURE TYPE (HCC): ICD-10-CM

## 2019-01-10 PROCEDURE — 99204 OFFICE O/P NEW MOD 45 MIN: CPT | Performed by: FAMILY MEDICINE

## 2019-01-10 PROCEDURE — 90670 PCV13 VACCINE IM: CPT | Performed by: FAMILY MEDICINE

## 2019-01-10 PROCEDURE — G0009 ADMIN PNEUMOCOCCAL VACCINE: HCPCS | Performed by: FAMILY MEDICINE

## 2019-01-10 RX ORDER — NORTRIPTYLINE HYDROCHLORIDE 10 MG/1
10 CAPSULE ORAL NIGHTLY
Qty: 30 CAPSULE | Refills: 5 | Status: SHIPPED | OUTPATIENT
Start: 2019-01-10 | End: 2019-05-13

## 2019-01-10 RX ORDER — PREDNISONE 1 MG/1
5 TABLET ORAL DAILY
COMMUNITY
Start: 2019-01-17 | End: 2019-01-28

## 2019-01-10 RX ORDER — GABAPENTIN 100 MG/1
100 CAPSULE ORAL DAILY
COMMUNITY
Start: 2018-11-19 | End: 2019-01-10 | Stop reason: ALTCHOICE

## 2019-01-10 ASSESSMENT — ENCOUNTER SYMPTOMS
EYES NEGATIVE: 1
GASTROINTESTINAL NEGATIVE: 1
RESPIRATORY NEGATIVE: 1
ALLERGIC/IMMUNOLOGIC NEGATIVE: 1

## 2019-01-10 ASSESSMENT — PATIENT HEALTH QUESTIONNAIRE - PHQ9
1. LITTLE INTEREST OR PLEASURE IN DOING THINGS: 0
SUM OF ALL RESPONSES TO PHQ QUESTIONS 1-9: 0
2. FEELING DOWN, DEPRESSED OR HOPELESS: 0
SUM OF ALL RESPONSES TO PHQ9 QUESTIONS 1 & 2: 0
SUM OF ALL RESPONSES TO PHQ QUESTIONS 1-9: 0

## 2019-01-11 DIAGNOSIS — E11.42 TYPE 2 DIABETES MELLITUS WITH DIABETIC POLYNEUROPATHY, WITHOUT LONG-TERM CURRENT USE OF INSULIN (HCC): ICD-10-CM

## 2019-01-11 DIAGNOSIS — E03.9 HYPOTHYROIDISM, UNSPECIFIED TYPE: ICD-10-CM

## 2019-01-11 LAB
ALBUMIN SERPL-MCNC: 4.2 G/DL (ref 3.5–5.2)
ALP BLD-CCNC: 131 U/L (ref 35–104)
ALT SERPL-CCNC: 5 U/L (ref 5–33)
ANION GAP SERPL CALCULATED.3IONS-SCNC: 14 MMOL/L (ref 7–19)
AST SERPL-CCNC: 8 U/L (ref 5–32)
BILIRUB SERPL-MCNC: 0.6 MG/DL (ref 0.2–1.2)
BILIRUBIN URINE: NEGATIVE
BLOOD, URINE: NEGATIVE
BUN BLDV-MCNC: 48 MG/DL (ref 8–23)
C-REACTIVE PROTEIN: 2.6 MG/DL (ref 0–0.5)
CALCIUM SERPL-MCNC: 10.1 MG/DL (ref 8.8–10.2)
CHLORIDE BLD-SCNC: 94 MMOL/L (ref 98–111)
CHOLESTEROL, TOTAL: 228 MG/DL (ref 160–199)
CLARITY: CLEAR
CO2: 35 MMOL/L (ref 22–29)
COLOR: YELLOW
CREAT SERPL-MCNC: 1.6 MG/DL (ref 0.5–0.9)
GFR NON-AFRICAN AMERICAN: 31
GLUCOSE BLD-MCNC: 83 MG/DL (ref 74–109)
GLUCOSE URINE: NEGATIVE MG/DL
HBA1C MFR BLD: 6.6 % (ref 4–6)
HCT VFR BLD CALC: 48.9 % (ref 37–47)
HDLC SERPL-MCNC: 61 MG/DL (ref 65–121)
HEMOGLOBIN: 14.5 G/DL (ref 12–16)
KETONES, URINE: NEGATIVE MG/DL
LDL CHOLESTEROL CALCULATED: 131 MG/DL
LEUKOCYTE ESTERASE, URINE: NEGATIVE
MCH RBC QN AUTO: 28.8 PG (ref 27–31)
MCHC RBC AUTO-ENTMCNC: 29.7 G/DL (ref 33–37)
MCV RBC AUTO: 97 FL (ref 81–99)
NITRITE, URINE: NEGATIVE
PDW BLD-RTO: 14.6 % (ref 11.5–14.5)
PH UA: 6
PLATELET # BLD: 332 K/UL (ref 130–400)
PMV BLD AUTO: 11 FL (ref 9.4–12.3)
POTASSIUM SERPL-SCNC: 4.3 MMOL/L (ref 3.5–5)
PROTEIN UA: NEGATIVE MG/DL
RBC # BLD: 5.04 M/UL (ref 4.2–5.4)
SEDIMENTATION RATE, ERYTHROCYTE: 13 MM/HR (ref 0–25)
SODIUM BLD-SCNC: 143 MMOL/L (ref 136–145)
SPECIFIC GRAVITY UA: 1.02
TOTAL CK: 18 U/L (ref 26–192)
TOTAL PROTEIN: 7.3 G/DL (ref 6.6–8.7)
TRIGL SERPL-MCNC: 180 MG/DL (ref 0–149)
TSH SERPL DL<=0.05 MIU/L-ACNC: 1.03 UIU/ML (ref 0.27–4.2)
URIC ACID, SERUM: 4.8 MG/DL (ref 2.4–5.7)
UROBILINOGEN, URINE: 0.2 E.U./DL
WBC # BLD: 13.2 K/UL (ref 4.8–10.8)

## 2019-01-15 LAB — CCP IGG ANTIBODIES: 87 UNITS (ref 0–19)

## 2019-01-17 DIAGNOSIS — E11.42 TYPE 2 DIABETES MELLITUS WITH DIABETIC POLYNEUROPATHY, WITHOUT LONG-TERM CURRENT USE OF INSULIN (HCC): Primary | ICD-10-CM

## 2019-01-17 NOTE — PROGRESS NOTES
Please notify patient no significant pauses or slow heart rates on Holter. See if she had any symptoms while wearing. Dr. Leavitt didn't get a diary. She had some supraventricular tachycardia but Dr. Leavitt didn't report how much. See if swelling has improved. Call if any worsening of symptoms. TX!

## 2019-01-17 NOTE — TELEPHONE ENCOUNTER
Katie with [facility] wants to know if you think it is safe for pt to continue PT.  They had a hold on it due to the pt coming to be seen here and then having a holter monitor put on .

## 2019-01-22 DIAGNOSIS — E11.42 TYPE 2 DIABETES MELLITUS WITH DIABETIC POLYNEUROPATHY, WITHOUT LONG-TERM CURRENT USE OF INSULIN (HCC): ICD-10-CM

## 2019-01-22 LAB
ALBUMIN SERPL-MCNC: 4.5 G/DL (ref 3.5–5.2)
ALP BLD-CCNC: 143 U/L (ref 35–104)
ALT SERPL-CCNC: <5 U/L (ref 5–33)
ANION GAP SERPL CALCULATED.3IONS-SCNC: 25 MMOL/L (ref 7–19)
AST SERPL-CCNC: 10 U/L (ref 5–32)
BILIRUB SERPL-MCNC: 0.5 MG/DL (ref 0.2–1.2)
BUN BLDV-MCNC: 49 MG/DL (ref 8–23)
CALCIUM SERPL-MCNC: 10.4 MG/DL (ref 8.8–10.2)
CHLORIDE BLD-SCNC: 93 MMOL/L (ref 98–111)
CO2: 25 MMOL/L (ref 22–29)
CREAT SERPL-MCNC: 1.8 MG/DL (ref 0.5–0.9)
GFR NON-AFRICAN AMERICAN: 27
GLUCOSE BLD-MCNC: 257 MG/DL (ref 74–109)
HCT VFR BLD CALC: 52.5 % (ref 37–47)
HEMOGLOBIN: 15.9 G/DL (ref 12–16)
MCH RBC QN AUTO: 29.1 PG (ref 27–31)
MCHC RBC AUTO-ENTMCNC: 30.3 G/DL (ref 33–37)
MCV RBC AUTO: 96 FL (ref 81–99)
MICROALBUMIN UR-MCNC: <1.2 MG/DL (ref 0–19)
PDW BLD-RTO: 14.7 % (ref 11.5–14.5)
PLATELET # BLD: 319 K/UL (ref 130–400)
PMV BLD AUTO: 10.4 FL (ref 9.4–12.3)
POTASSIUM SERPL-SCNC: 5 MMOL/L (ref 3.5–5)
RBC # BLD: 5.47 M/UL (ref 4.2–5.4)
SODIUM BLD-SCNC: 143 MMOL/L (ref 136–145)
TOTAL PROTEIN: 7.9 G/DL (ref 6.6–8.7)
WBC # BLD: 14.9 K/UL (ref 4.8–10.8)

## 2019-01-24 ENCOUNTER — OFFICE VISIT (OUTPATIENT)
Dept: FAMILY MEDICINE CLINIC | Age: 84
End: 2019-01-24
Payer: MEDICARE

## 2019-01-24 VITALS
OXYGEN SATURATION: 90 % | HEART RATE: 60 BPM | TEMPERATURE: 97.6 F | DIASTOLIC BLOOD PRESSURE: 78 MMHG | SYSTOLIC BLOOD PRESSURE: 132 MMHG | BODY MASS INDEX: 30.08 KG/M2 | WEIGHT: 139 LBS

## 2019-01-24 DIAGNOSIS — E11.42 TYPE 2 DIABETES MELLITUS WITH DIABETIC POLYNEUROPATHY, WITHOUT LONG-TERM CURRENT USE OF INSULIN (HCC): Primary | ICD-10-CM

## 2019-01-24 DIAGNOSIS — I50.9 CONGESTIVE HEART FAILURE, UNSPECIFIED HF CHRONICITY, UNSPECIFIED HEART FAILURE TYPE (HCC): ICD-10-CM

## 2019-01-24 DIAGNOSIS — Z91.81 RISK FOR FALLS: ICD-10-CM

## 2019-01-24 DIAGNOSIS — N18.4 CKD STAGE 4 DUE TO TYPE 2 DIABETES MELLITUS (HCC): ICD-10-CM

## 2019-01-24 DIAGNOSIS — E11.22 CKD STAGE 4 DUE TO TYPE 2 DIABETES MELLITUS (HCC): ICD-10-CM

## 2019-01-24 PROCEDURE — 99214 OFFICE O/P EST MOD 30 MIN: CPT | Performed by: FAMILY MEDICINE

## 2019-01-24 ASSESSMENT — ENCOUNTER SYMPTOMS
RESPIRATORY NEGATIVE: 1
GASTROINTESTINAL NEGATIVE: 1
ALLERGIC/IMMUNOLOGIC NEGATIVE: 1
EYES NEGATIVE: 1

## 2019-02-05 ENCOUNTER — TELEPHONE (OUTPATIENT)
Dept: FAMILY MEDICINE CLINIC | Age: 84
End: 2019-02-05

## 2019-02-05 RX ORDER — AMLODIPINE BESYLATE 10 MG/1
10 TABLET ORAL DAILY
Qty: 30 TABLET | Refills: 5 | Status: SHIPPED | OUTPATIENT
Start: 2019-02-05

## 2019-02-07 NOTE — TELEPHONE ENCOUNTER
[facility] wants to know if HR parameters can be changed from 60-90 to 50-90 due to her HR being in the 50s lately and her having no issues.  They can not do PT with her unless the parameters are changed since her HR has not been in the previous parameters that were ordered.

## 2019-02-07 NOTE — TELEPHONE ENCOUNTER
Yes that will be fine. Let me know if they need something in writing if a verbal order isn't enough. TX!

## 2019-02-22 NOTE — PROGRESS NOTES
Subjective:     Encounter Date:02/22/2019    Chief Complaint:    Patient ID: Mis Fontaine is a 83 y.o. female here today for 3 month cardiac follow-up. She is accompanied by her daughter.     HPI     Congestive Heart Failure      Additional comments: chronic diastolic, SOA making her bed, vacuuming, walking up stairs, on oxygen but not wearing it at night - she would like to die in her sleep, has a chronic cough - non productive (on lisinopril). Chronic orthopnea with HOB elevated a few inches, no PND, nocturia 3 times per night. Swelling improved. Only worse compression hose one day.               Coronary Artery Disease      Additional comments: no chest discomfort, hx CABG - occasionally has a twinge              Hypertension      Additional comments: fluctuating - usually 120-130s/60-70s              Hyperlipidemia      Additional comments: still hasn't had labs drawn - daughter went to [place], will go this afternoon          Last edited by Tracey Feliciano APRN on 2/22/2019  1:31 PM. (History)        History:   Past Medical History:   Diagnosis Date   • Atherosclerosis of native coronary artery of native heart without angina pectoris    • Cardiac murmur, previously undiagnosed    • Chest pain     Unspecified   • CHF (congestive heart failure) (CMS/Formerly [facility])    • Chronic kidney disease    • Diabetes mellitus (CMS/Formerly [facility])    • Fatigue     Unspecified   • GERD (gastroesophageal reflux disease)    • Gout    • Hyperlipidemia     Unspecified hyperlipidemia type   • Hypertension     Essential benign   • Hypothyroidism    • Nocturnal hypoxia    • Osteoarthritis    • Osteoporosis    • Pneumonia    • SOB (shortness of breath)    • Statin intolerance      Past Surgical History:   Procedure Laterality Date   • CAROTID ENDARTERECTOMY     • CAROTID STENT  12/22/2015     L carotid stents x 2 - Consuelo, RX [employer] 7mm x 30 mm   • CARPAL TUNNEL RELEASE      Both   • CATARACT EXTRACTION     • CERVICAL FUSION     • CORONARY  ARTERY BYPASS GRAFT  10/24/2013    S/P LIMA to LAD, SVT to OM, SVG to distal RCA, Dr. Mccray, [facility]   • KNEE ARTHROSCOPY      Right   • LUMBAR FUSION     • TUBAL ABDOMINAL LIGATION       Social History     Socioeconomic History   • Marital status:      Spouse name: Not on file   • Number of children: Not on file   • Years of education: Not on file   • Highest education level: Not on file   Social Needs   • Financial resource strain: Not on file   • Food insecurity - worry: Not on file   • Food insecurity - inability: Not on file   • Transportation needs - medical: Not on file   • Transportation needs - non-medical: Not on file   Occupational History   • Not on file   Tobacco Use   • Smoking status: Former Smoker     Packs/day: 1.50     Years: 30.00     Pack years: 45.00     Types: Cigarettes     Start date:      Last attempt to quit: 1992     Years since quittin.0   • Smokeless tobacco: Never Used   Substance and Sexual Activity   • Alcohol use: No   • Drug use: No   • Sexual activity: Not Currently     Partners: Male   Other Topics Concern   • Not on file   Social History Narrative   • Not on file     Family History   Problem Relation Age of Onset   • Coronary artery disease Father    • Hypertension Father    • Stroke Father         Completed Stroke   • Asthma Other    • Thyroid disease Other    • Hyperlipidemia Other        Outpatient Medications Marked as Taking for the 19 encounter (Office Visit) with Tracey Feliciano APRN   Medication Sig Dispense Refill   • Acetaminophen (TYLENOL 8 HOUR ARTHRITIS PAIN PO) Take 1-2 tablets by mouth 2 (Two) Times a Day.     • allopurinol (ZYLOPRIM) 300 MG tablet Take 300 mg by mouth Daily.     • amLODIPine (NORVASC) 10 MG tablet Take 10 mg by mouth Daily.     • aspirin 81 MG chewable tablet Chew 81 mg Daily.     • budesonide-formoterol (SYMBICORT) 160-4.5 MCG/ACT inhaler Inhale 2 puffs 2 (Two) Times a Day.     • clopidogrel (PLAVIX) 75 MG tablet Take 75  mg by mouth Daily.     • ferrous sulfate 325 (65 FE) MG tablet Take 325 mg by mouth Daily With Breakfast.     • furosemide (LASIX) 40 MG tablet Take 1-2 tablets by mouth 2 (Two) Times a Day. (Patient taking differently: Take 40 mg by mouth 2 (Two) Times a Day.) 120 tablet 11   • levothyroxine (SYNTHROID, LEVOTHROID) 150 MCG tablet Take 150 mcg by mouth Daily.     • magnesium oxide (MAG-OX) 400 MG tablet Take 400 mg by mouth Daily.     • metoprolol succinate XL (TOPROL-XL) 50 MG 24 hr tablet Take 1 tablet by mouth 3 (Three) Times a Day. 90 tablet 11   • nortriptyline (PAMELOR) 10 MG capsule Take 10 mg by mouth Every Night.     • O2 (OXYGEN) Inhale 2 L/min Continuous.     • Omega-3 Fatty Acids (OMEGA-3 FISH OIL) 1000 MG capsule Take  by mouth.     • omeprazole OTC (PriLOSEC OTC) 20 MG EC tablet Take 20 mg by mouth Daily.     • predniSONE (DELTASONE) 5 MG tablet Take 5 mg by mouth Daily.     • SITagliptin (JANUVIA) 50 MG tablet Take 50 mg by mouth Daily.     • VITAMIN B1-B12 IJ Inject 1,000 Units as directed Every 28 (Twenty-Eight) Days.     • [DISCONTINUED] lisinopril (PRINIVIL,ZESTRIL) 20 MG tablet Take 1 tablet by mouth 2 (Two) Times a Day. 180 tablet 3       Review of Systems:  Review of Systems   Constitution: Positive for chills, decreased appetite, weakness and malaise/fatigue. Negative for fever.   HENT: Negative for congestion and nosebleeds.    Eyes: Positive for blurred vision. Negative for double vision.   Cardiovascular: Positive for chest pain (occ ), dyspnea on exertion and leg swelling (both lower legs). Negative for irregular heartbeat, near-syncope, palpitations and syncope.   Respiratory: Positive for cough (X6 WEEKS) and shortness of breath. Negative for wheezing.    Endocrine: Negative for cold intolerance and heat intolerance.   Hematologic/Lymphatic: Bruises/bleeds easily.   Skin: Positive for dry skin. Negative for itching and rash (under L breast).   Musculoskeletal: Positive for arthritis,  "back pain, falls (11/20/2018 ), gout, joint pain (knees especially), joint swelling, muscle cramps, neck pain and stiffness. Negative for muscle weakness (legs).   Gastrointestinal: Negative for abdominal pain, constipation, diarrhea, heartburn, melena, nausea and vomiting.   Genitourinary: Positive for frequency and nocturia (3 to 4 times a night ). Negative for dysuria and hematuria.   Neurological: Positive for excessive daytime sleepiness, light-headedness (occ ), loss of balance and numbness (legs ,and left hand and fingers). Negative for dizziness and headaches.   Psychiatric/Behavioral: Negative for depression. The patient has insomnia. The patient is not nervous/anxious.           Objective:   /60 (BP Location: Left arm, Patient Position: Sitting, Cuff Size: Adult)   Pulse 57   Ht 144.8 cm (57\")   Wt 64.5 kg (142 lb 3.2 oz)   SpO2 96%   BMI 30.77 kg/m²   Wt Readings from Last 3 Encounters:   02/22/19 64.5 kg (142 lb 3.2 oz)   11/20/18 66 kg (145 lb 9.6 oz)   10/12/18 65.4 kg (144 lb 3.2 oz)         Physical Exam   Constitutional: She is oriented to person, place, and time. She appears well-developed and well-nourished.   obese   Eyes: No scleral icterus.   Neck: No JVD present.   Cardiovascular: Normal rate, regular rhythm, normal heart sounds and intact distal pulses. Exam reveals no gallop and no friction rub.   No murmur heard.  Pulmonary/Chest: Effort normal. She has decreased breath sounds.   Abdominal:   obese   Musculoskeletal: She exhibits edema (1+ pitting BLEs (worse RLE)) and tenderness.   Neurological: She is alert and oriented to person, place, and time.   Skin: Skin is warm and dry.   Psychiatric: She has a normal mood and affect.   Vitals reviewed.      Lab/Diagnostics Review:   1/22/2019 (care everywhere)  CBC WBC 14,900 hemoglobin 15.9 hematocrit 52.5% platelets 319,000  BMP sodium 143 potassium 5.0 chloride 93 CO2 25 BUN 45 creatinine 1.8 calcium 10.4 glucose 257 total protein " 7.9  Hepatic function panel alk phos 143 AST 10 ALT less than 5    1/11/2019 (care everywhere)   Lipid panel total cholesterol 228 triglycerides 180 HDL 61   TSH 1.03    10/10/2018  CBC WBC 8500, hemoglobin 16.1, hematocrit 53.7%, platelets 262,000  BMP sodium 140, potassium 3.8, chloride 100, CO2 34.7, BUN 26, creatinine 1.5, albumin 3.8, calcium 9.5, glucose 101, phosphorus 3.8, uric acid 14     09/18/2018 echocardiogram EF 55-60%, grade 1 diastolic dysfunction, mild MR, mild RVE, RVSP elevated at 40-45 mmHg, mild JILLIAN, mild to moderate TR and mild PI      9/14/2018 NT-proBNP 7596     02/22/2018  CMP sodium 141, potassium 5.1, chloride 102, CO2 33, BUN 23, creatinine 1.2, calcium 10.0, glucose 128, total protein 7.6, albumin 3.7, total bilirubin 0.6, alkaline phosphatase 160, AST 13, ALT 16  Hemoglobin A1c 7.4%  TSH 3.91     02/04/2016  CBC WBC 8.9, Hgb 12.9, Hct 43.4, platelets 253,000  CMP sodium 142, chloride 103, potassium 6.1, CO2 34.1, calcium 9.3, glucose 122, alkaline phosphatase 183, AST 13, ALT 22, albumin 3.8, total protein 7.1  TSH 1.14  Lipid Panel , , HDL 43,   Mg 2.2, Hgb A1C 7.3%      01/13/2016 EKG sinus rhythm 61 beats, right axis deviation,nonspecific intraventricular block, non-specific T-wave abnormality, no previous for comparison      2/11/2015 Lipid Panel total cholesterol 205, triglyceride 255, HDL 39,    10/15/2015 TSH 1.12      04/13/2015 PFTs mild obstruction, no significant bronchodilator response, and normal lung volumes, increased airway resistance, moderately reduced diffusion capacity.       07/31/2013 Lexiscan sestamibi small apical defect consistent with ischemia, EF 95%      ECG 12 Lead  Date/Time: 3/5/2018 1:13 PM  Performed by: SASHA NAVAS  Authorized by: SASHA NAVAS   Comparison: compared with previous ECG from 1/13/2016  Comparison to previous ECG: Nonspecific intraventricular block replaced by R BBB  Rhythm: sinus  rhythm  Rhythm comments: with sinus arrhythmia  Rate: normal  BPM: 68  Conduction: right bundle branch block  Clinical impression: abnormal ECG       ECG 12 Lead  Date/Time: 9/14/2018 2:04 PM  Performed by: SASHA NAVAS  Authorized by: SASHA NAVAS   Comparison: compared with previous ECG from 3/5/2018  Comparison to previous ECG: Inferolateral ST/T wave depressions have worsened, possible septal infarct  Rhythm: sinus rhythm  Rate: normal  BPM: 89  Conduction: right bundle branch block  Other findings: LAE  Clinical impression: abnormal ECG    Procedures        Assessment/Plan:         Problem List Items Addressed This Visit        Cardiovascular and Mediastinum    Atherosclerosis of native coronary artery of native heart without angina pectoris (Chronic)    Current Assessment & Plan     Stable, continue medical management.          Relevant Medications    Alirocumab 75 MG/ML solution pen-injector    Hypertension (Chronic)    Current Assessment & Plan     Well controlled with medications.          Relevant Medications    valsartan (DIOVAN) 80 MG tablet    Mixed hyperlipidemia (Chronic)    Current Assessment & Plan     Found labs from last month on [facility]where. Lipids still not to goal LDL less than 70.  Will start Praluent 75 mg every 2 weeks and recheck lipids and CMP in 6-8 weeks. Discussed benefits of PCSK9 inhibitors. Unable to tolerate statins. To call if unable to get from pharmacy or if needs prior auth/pt assistance.          Relevant Medications    Alirocumab 75 MG/ML solution pen-injector    Other Relevant Orders    Lipid Panel    Comprehensive Metabolic Panel    Chronic diastolic heart failure (CMS/HCC) - Primary (Chronic)    Current Assessment & Plan     Fair compensation. Hasn't needed further extra diuretic. Will change lisinopril to valsartan to see if dry cough improves/resolves.          Relevant Medications    valsartan (DIOVAN) 80 MG tablet       Respiratory    Nocturnal  hypoxia (Chronic)    Current Assessment & Plan     Encouraged her to wear oxygen at night but doesn't wear because she pulls it off in her sleep. Has declined CPAP.             Other    Hx of CABG (Chronic)    Relevant Medications    Alirocumab 75 MG/ML solution pen-injector        Return in about 3 months (around 5/22/2019) for [facility] Failure [facility].           GIO Sanchez, ACNP-BC, CHFN-BC

## 2019-02-22 NOTE — ASSESSMENT & PLAN NOTE
Encouraged her to wear oxygen at night but doesn't wear because she pulls it off in her sleep. Has declined CPAP.

## 2019-02-22 NOTE — ASSESSMENT & PLAN NOTE
Fair compensation. Hasn't needed further extra diuretic. Will change lisinopril to valsartan to see if dry cough improves/resolves.

## 2019-02-22 NOTE — PATIENT INSTRUCTIONS
Alirocumab injection  What is this medicine?  ALIROCUMAB (al i JAILYN ue mab) is known as a PCSK9 inhibitor. It is used to lower the level of cholesterol in the blood. This medicine is only for patients whose cholesterol is not controlled by diet and statin therapy.  This medicine may be used for other purposes; ask your health care provider or pharmacist if you have questions.  COMMON BRAND NAME(S): Praluent  What should I tell my health care provider before I take this medicine?  They need to know if you have any of these conditions:  -any unusual or allergic reaction to alirocumab, other medicines, foods, dyes, or preservatives  -pregnant or trying to get pregnant  -breast-feeding  How should I use this medicine?  This medicine is for injection under the skin. You will be taught how to prepare and give this medicine. Use exactly as directed. Take your medicine at regular intervals. Do not take your medicine more often than directed.  It is important that you put your used needles and syringes in a special sharps container. Do not put them in a trash can. If you do not have a sharps container, call your pharmacist or healthcare provider to get one.  Talk to your pediatrician regarding the use of this medicine in children. Special care may be needed.  Overdosage: If you think you have taken too much of this medicine contact a poison control center or emergency room at once.  NOTE: This medicine is only for you. Do not share this medicine with others.  What if I miss a dose?  If you are on an every 2 week schedule and miss a dose, take it as soon as you can. If your next dose is to be taken in less than 7 days, then do not take the missed dose. Take the next dose at your regular time. Do not take double or extra doses. If you are on a monthly schedule and miss a dose, take it as soon as you can. If the dose given is within 7 days of the missed dose, continue with your regular monthly schedule. If the missed dose is  administered after 7 days of the original date, administer the dose and start a new monthly schedule based on this date.  What may interact with this medicine?  Interactions are not expected.  This list may not describe all possible interactions. Give your health care provider a list of all the medicines, herbs, non-prescription drugs, or dietary supplements you use. Also tell them if you smoke, drink alcohol, or use illegal drugs. Some items may interact with your medicine.  What should I watch for while using this medicine?  You may need blood work done while you are taking this medicine.  What side effects may I notice from receiving this medicine?  Side effects that you should report to your doctor or health care professional as soon as possible:  -allergic reactions like skin rash, itching or hives, swelling of the face, lips, or tongue  -signs and symptoms of infection like fever or chills; cough; sore throat; pain or trouble passing urine  -signs and symptoms of liver injury like dark yellow or brown urine; general ill feeling or flu-like symptoms; light-colored stools; loss of appetite; nausea; right upper belly pain; unusually weak or tired; yellowing of the eyes or skin  Side effects that usually do not require medical attention (report to your doctor or health care professional if they continue or are bothersome):  -diarrhea  -muscle cramps  -muscle pain  -pain, redness, or irritation at site where injected  This list may not describe all possible side effects. Call your doctor for medical advice about side effects. You may report side effects to FDA at 9-613-FDA-0408.  Where should I keep my medicine?  Keep out of the reach of children.  You will be instructed on how to store this medicine. Throw away any unused medicine after the expiration date on the label.  NOTE: This sheet is a summary. It may not cover all possible information. If you have questions about this medicine, talk to your doctor,  pharmacist, or health care provider.  © 2018 [employer]/Gold Standard (2017-04-26 15:09:06)

## 2019-02-22 NOTE — ASSESSMENT & PLAN NOTE
Found labs from last month on [facility]where. Lipids still not to goal LDL less than 70.  Will start Praluent 75 mg every 2 weeks and recheck lipids and CMP in 6-8 weeks. Discussed benefits of PCSK9 inhibitors. Unable to tolerate statins. To call if unable to get from pharmacy or if needs prior auth/pt assistance.

## 2019-02-26 ENCOUNTER — TELEPHONE (OUTPATIENT)
Dept: FAMILY MEDICINE CLINIC | Age: 84
End: 2019-02-26

## 2019-03-06 ENCOUNTER — OFFICE VISIT (OUTPATIENT)
Dept: FAMILY MEDICINE CLINIC | Age: 84
End: 2019-03-06
Payer: MEDICARE

## 2019-03-06 ENCOUNTER — HOSPITAL ENCOUNTER (OUTPATIENT)
Dept: GENERAL RADIOLOGY | Age: 84
Discharge: HOME | End: 2019-03-06
Payer: MEDICARE

## 2019-03-06 VITALS
OXYGEN SATURATION: 94 % | DIASTOLIC BLOOD PRESSURE: 76 MMHG | BODY MASS INDEX: 31.38 KG/M2 | WEIGHT: 145 LBS | SYSTOLIC BLOOD PRESSURE: 120 MMHG | TEMPERATURE: 97.6 F | HEART RATE: 67 BPM

## 2019-03-06 DIAGNOSIS — J44.1 COPD WITH ACUTE EXACERBATION (HCC): ICD-10-CM

## 2019-03-06 DIAGNOSIS — E11.42 TYPE 2 DIABETES MELLITUS WITH DIABETIC POLYNEUROPATHY, WITHOUT LONG-TERM CURRENT USE OF INSULIN (HCC): Primary | ICD-10-CM

## 2019-03-06 PROCEDURE — 71046 X-RAY EXAM CHEST 2 VIEWS: CPT

## 2019-03-06 PROCEDURE — 99214 OFFICE O/P EST MOD 30 MIN: CPT | Performed by: FAMILY MEDICINE

## 2019-03-06 PROCEDURE — 96372 THER/PROPH/DIAG INJ SC/IM: CPT | Performed by: FAMILY MEDICINE

## 2019-03-06 RX ORDER — PREGABALIN 50 MG/1
50 CAPSULE ORAL 3 TIMES DAILY
Qty: 90 CAPSULE | Refills: 3 | Status: SHIPPED | OUTPATIENT
Start: 2019-03-06 | End: 2019-03-22 | Stop reason: ALTCHOICE

## 2019-03-06 RX ORDER — TRIAMCINOLONE ACETONIDE 40 MG/ML
40 INJECTION, SUSPENSION INTRA-ARTICULAR; INTRAMUSCULAR ONCE
Status: COMPLETED | OUTPATIENT
Start: 2019-03-06 | End: 2019-03-06

## 2019-03-06 RX ORDER — ALBUTEROL SULFATE 2.5 MG/3ML
2.5 SOLUTION RESPIRATORY (INHALATION) EVERY 6 HOURS PRN
Qty: 120 EACH | Refills: 3 | Status: SHIPPED | OUTPATIENT
Start: 2019-03-06 | End: 2019-03-08 | Stop reason: SDUPTHER

## 2019-03-06 RX ORDER — VALSARTAN 80 MG/1
80 TABLET ORAL
COMMUNITY
Start: 2019-02-22

## 2019-03-06 RX ORDER — DEXAMETHASONE SODIUM PHOSPHATE 4 MG/ML
4 INJECTION, SOLUTION INTRA-ARTICULAR; INTRALESIONAL; INTRAMUSCULAR; INTRAVENOUS; SOFT TISSUE ONCE
Status: COMPLETED | OUTPATIENT
Start: 2019-03-06 | End: 2019-03-06

## 2019-03-06 RX ORDER — AMOXICILLIN AND CLAVULANATE POTASSIUM 500; 125 MG/1; MG/1
1 TABLET, FILM COATED ORAL 2 TIMES DAILY
Qty: 20 TABLET | Refills: 0 | Status: SHIPPED | OUTPATIENT
Start: 2019-03-06 | End: 2019-03-16

## 2019-03-06 RX ADMIN — TRIAMCINOLONE ACETONIDE 40 MG: 40 INJECTION, SUSPENSION INTRA-ARTICULAR; INTRAMUSCULAR at 17:02

## 2019-03-06 RX ADMIN — DEXAMETHASONE SODIUM PHOSPHATE 4 MG: 4 INJECTION, SOLUTION INTRA-ARTICULAR; INTRALESIONAL; INTRAMUSCULAR; INTRAVENOUS; SOFT TISSUE at 17:01

## 2019-03-06 ASSESSMENT — ENCOUNTER SYMPTOMS
GASTROINTESTINAL NEGATIVE: 1
EYES NEGATIVE: 1
ALLERGIC/IMMUNOLOGIC NEGATIVE: 1
COUGH: 1

## 2019-03-08 ENCOUNTER — TELEPHONE (OUTPATIENT)
Dept: FAMILY MEDICINE CLINIC | Age: 84
End: 2019-03-08

## 2019-03-08 DIAGNOSIS — J44.1 COPD WITH ACUTE EXACERBATION (HCC): ICD-10-CM

## 2019-03-08 RX ORDER — ALBUTEROL SULFATE 2.5 MG/3ML
2.5 SOLUTION RESPIRATORY (INHALATION) EVERY 6 HOURS PRN
Qty: 120 EACH | Refills: 3 | Status: SHIPPED | OUTPATIENT
Start: 2019-03-08 | End: 2019-03-19 | Stop reason: SDUPTHER

## 2019-03-19 ENCOUNTER — TELEPHONE (OUTPATIENT)
Dept: INTERNAL MEDICINE | Age: 84
End: 2019-03-19

## 2019-03-19 DIAGNOSIS — J44.1 COPD WITH ACUTE EXACERBATION (HCC): ICD-10-CM

## 2019-03-19 RX ORDER — ALBUTEROL SULFATE 2.5 MG/3ML
2.5 SOLUTION RESPIRATORY (INHALATION) 4 TIMES DAILY
Qty: 120 EACH | Refills: 3 | Status: SHIPPED | OUTPATIENT
Start: 2019-03-19

## 2019-03-22 ENCOUNTER — OFFICE VISIT (OUTPATIENT)
Dept: FAMILY MEDICINE CLINIC | Age: 84
End: 2019-03-22
Payer: MEDICARE

## 2019-03-22 VITALS
BODY MASS INDEX: 31.16 KG/M2 | HEART RATE: 82 BPM | WEIGHT: 144 LBS | DIASTOLIC BLOOD PRESSURE: 70 MMHG | SYSTOLIC BLOOD PRESSURE: 128 MMHG | TEMPERATURE: 97.5 F | OXYGEN SATURATION: 95 %

## 2019-03-22 DIAGNOSIS — R44.3 HALLUCINATION: ICD-10-CM

## 2019-03-22 DIAGNOSIS — I50.9 CONGESTIVE HEART FAILURE, UNSPECIFIED HF CHRONICITY, UNSPECIFIED HEART FAILURE TYPE (HCC): ICD-10-CM

## 2019-03-22 DIAGNOSIS — J18.9 PNEUMONIA OF BOTH LOWER LOBES DUE TO INFECTIOUS ORGANISM: Primary | ICD-10-CM

## 2019-03-22 DIAGNOSIS — R42 LOSS OF EQUILIBRIUM: ICD-10-CM

## 2019-03-22 DIAGNOSIS — J44.1 COPD WITH ACUTE EXACERBATION (HCC): ICD-10-CM

## 2019-03-22 DIAGNOSIS — E11.42 TYPE 2 DIABETES MELLITUS WITH DIABETIC POLYNEUROPATHY, WITHOUT LONG-TERM CURRENT USE OF INSULIN (HCC): ICD-10-CM

## 2019-03-22 PROCEDURE — 99495 TRANSJ CARE MGMT MOD F2F 14D: CPT | Performed by: FAMILY MEDICINE

## 2019-03-22 PROCEDURE — 1111F DSCHRG MED/CURRENT MED MERGE: CPT | Performed by: FAMILY MEDICINE

## 2019-03-22 ASSESSMENT — ENCOUNTER SYMPTOMS
GASTROINTESTINAL NEGATIVE: 1
ALLERGIC/IMMUNOLOGIC NEGATIVE: 1
EYES NEGATIVE: 1
RESPIRATORY NEGATIVE: 1

## 2019-03-25 DIAGNOSIS — E11.42 TYPE 2 DIABETES MELLITUS WITH DIABETIC POLYNEUROPATHY, WITHOUT LONG-TERM CURRENT USE OF INSULIN (HCC): ICD-10-CM

## 2019-03-25 LAB
ALBUMIN SERPL-MCNC: 3.8 G/DL (ref 3.5–5.2)
ALP BLD-CCNC: 153 U/L (ref 35–104)
ALT SERPL-CCNC: 11 U/L (ref 5–33)
ANION GAP SERPL CALCULATED.3IONS-SCNC: 11 MMOL/L (ref 7–19)
AST SERPL-CCNC: 12 U/L (ref 5–32)
BILIRUB SERPL-MCNC: 0.5 MG/DL (ref 0.2–1.2)
BILIRUBIN URINE: NEGATIVE
BLOOD, URINE: NEGATIVE
BUN BLDV-MCNC: 37 MG/DL (ref 8–23)
CALCIUM SERPL-MCNC: 9.1 MG/DL (ref 8.8–10.2)
CHLORIDE BLD-SCNC: 92 MMOL/L (ref 98–111)
CHOLESTEROL, TOTAL: 231 MG/DL (ref 160–199)
CLARITY: CLEAR
CO2: 35 MMOL/L (ref 22–29)
COLOR: YELLOW
CREAT SERPL-MCNC: 1.5 MG/DL (ref 0.5–0.9)
GFR NON-AFRICAN AMERICAN: 33
GLUCOSE BLD-MCNC: 142 MG/DL (ref 74–109)
GLUCOSE URINE: NEGATIVE MG/DL
HBA1C MFR BLD: 8.4 % (ref 4–6)
HCT VFR BLD CALC: 38.9 % (ref 37–47)
HDLC SERPL-MCNC: 54 MG/DL (ref 65–121)
HEMOGLOBIN: 11.9 G/DL (ref 12–16)
KETONES, URINE: NEGATIVE MG/DL
LDL CHOLESTEROL CALCULATED: 123 MG/DL
LEUKOCYTE ESTERASE, URINE: NEGATIVE
MCH RBC QN AUTO: 30.3 PG (ref 27–31)
MCHC RBC AUTO-ENTMCNC: 30.6 G/DL (ref 33–37)
MCV RBC AUTO: 99 FL (ref 81–99)
MICROALBUMIN UR-MCNC: <1.2 MG/DL (ref 0–19)
NITRITE, URINE: NEGATIVE
PDW BLD-RTO: 14.7 % (ref 11.5–14.5)
PH UA: 7 (ref 5–8)
PLATELET # BLD: 235 K/UL (ref 130–400)
PMV BLD AUTO: 10.7 FL (ref 9.4–12.3)
POTASSIUM SERPL-SCNC: 4.8 MMOL/L (ref 3.5–5)
PROTEIN UA: NEGATIVE MG/DL
RBC # BLD: 3.93 M/UL (ref 4.2–5.4)
SODIUM BLD-SCNC: 138 MMOL/L (ref 136–145)
SPECIFIC GRAVITY UA: 1.01 (ref 1–1.03)
TOTAL PROTEIN: 6.6 G/DL (ref 6.6–8.7)
TRIGL SERPL-MCNC: 270 MG/DL (ref 0–149)
UROBILINOGEN, URINE: 0.2 E.U./DL
WBC # BLD: 14.6 K/UL (ref 4.8–10.8)

## 2019-03-26 ENCOUNTER — TELEPHONE (OUTPATIENT)
Dept: FAMILY MEDICINE CLINIC | Age: 84
End: 2019-03-26

## 2019-03-26 RX ORDER — DOXYCYCLINE 100 MG/1
100 TABLET ORAL 2 TIMES DAILY
Qty: 20 TABLET | Refills: 0 | Status: SHIPPED | OUTPATIENT
Start: 2019-03-26 | End: 2019-04-05

## 2019-04-08 ENCOUNTER — OFFICE VISIT (OUTPATIENT)
Dept: FAMILY MEDICINE CLINIC | Age: 84
End: 2019-04-08
Payer: MEDICARE

## 2019-04-08 VITALS
HEART RATE: 66 BPM | TEMPERATURE: 97.9 F | DIASTOLIC BLOOD PRESSURE: 72 MMHG | BODY MASS INDEX: 30.73 KG/M2 | OXYGEN SATURATION: 92 % | WEIGHT: 142 LBS | SYSTOLIC BLOOD PRESSURE: 120 MMHG

## 2019-04-08 DIAGNOSIS — N18.4 CKD STAGE 4 DUE TO TYPE 2 DIABETES MELLITUS (HCC): ICD-10-CM

## 2019-04-08 DIAGNOSIS — E11.42 TYPE 2 DIABETES MELLITUS WITH DIABETIC POLYNEUROPATHY, WITHOUT LONG-TERM CURRENT USE OF INSULIN (HCC): Primary | ICD-10-CM

## 2019-04-08 DIAGNOSIS — E11.22 CKD STAGE 4 DUE TO TYPE 2 DIABETES MELLITUS (HCC): ICD-10-CM

## 2019-04-08 DIAGNOSIS — E03.9 HYPOTHYROIDISM, UNSPECIFIED TYPE: ICD-10-CM

## 2019-04-08 PROCEDURE — 99214 OFFICE O/P EST MOD 30 MIN: CPT | Performed by: FAMILY MEDICINE

## 2019-04-08 NOTE — PROGRESS NOTES
Bandages & Supports (MEDICAL COMPRESSION STOCKINGS) MISC 1 each by Does not apply route daily 20-30 mmHg  Sized to fit 1 each 5    nortriptyline (PAMELOR) 10 MG capsule Take 1 capsule by mouth nightly 30 capsule 5    ferrous sulfate 325 (65 Fe) MG tablet Take 325 mg by mouth daily      allopurinol (ZYLOPRIM) 300 MG tablet Take 300 mg by mouth daily      budesonide-formoterol (SYMBICORT) 160-4.5 MCG/ACT AERO Inhale 1 puff into the lungs 2 times daily       OXYGEN Inhale 2 L into the lungs daily      levothyroxine (SYNTHROID) 137 MCG tablet Take 137 mcg by mouth Daily       aspirin 81 MG chewable tablet Take 81 mg by mouth daily      clopidogrel (PLAVIX) 75 MG tablet Take 75 mg by mouth daily      furosemide (LASIX) 40 MG tablet Take 40 mg by mouth 3 times daily       magnesium oxide (MAG-OX) 400 MG tablet Take 400 mg by mouth daily      metoprolol (TOPROL-XL) 50 MG XL tablet Take 50 mg by mouth 3 times daily       omeprazole (PRILOSEC OTC) 20 MG tablet Take 20.6 mg by mouth       No current facility-administered medications for this visit. Allergies   Allergen Reactions    Statins      Lose of feeling in legs.  Valium [Diazepam]        Review of Systems   Constitutional: Negative. HENT: Negative. Eyes: Negative. Respiratory: Negative. Cardiovascular: Negative. Gastrointestinal: Negative. Endocrine: Negative. Genitourinary: Negative. Musculoskeletal: Negative. Skin: Negative. Allergic/Immunologic: Negative. Neurological: Negative. Hematological: Negative. Psychiatric/Behavioral: Negative. OBJECTIVE:    Physical Exam   Constitutional: She is oriented to person, place, and time. Vital signs are normal. She appears well-developed and well-nourished. She is active. HENT:   Head: Normocephalic and atraumatic. Eyes: Pupils are equal, round, and reactive to light.  Conjunctivae and EOM are normal.   Neck: Normal range of motion and full passive range of motion without pain. Neck supple. Cardiovascular: Normal rate, regular rhythm, S1 normal, S2 normal and normal pulses. Murmur heard. Pulmonary/Chest: Effort normal. She has decreased breath sounds. Patient oxygen   Abdominal: Soft. Normal appearance and bowel sounds are normal.   Musculoskeletal: Normal range of motion. She exhibits edema. Neurological: She is alert and oriented to person, place, and time. She has normal strength and normal reflexes. Lower extremities strength 4 out of 5 bilateral   Skin: Skin is warm, dry and intact. Psychiatric: She has a normal mood and affect. Her speech is normal and behavior is normal. Judgment and thought content normal. Cognition and memory are normal.      /72 (Site: Left Upper Arm, Position: Sitting, Cuff Size: Medium Adult)   Pulse 66   Temp 97.9 °F (36.6 °C) (Oral)   Wt 142 lb (64.4 kg)   SpO2 92%   BMI 30.73 kg/m²      ASSESSMENT:     Diagnosis Orders   1. Type 2 diabetes mellitus with diabetic polyneuropathy, without long-term current use of insulin (HCC) -fair control  CBC    Comprehensive Metabolic Panel    Hemoglobin A1C    Urinalysis    MICROALBUMIN, RANDOM URINE (W/O CREATININE)    Lipid Panel   2. CKD stage 4 due to type 2 diabetes mellitus (HCC) -stable     3. Hypothyroidism, unspecified type -well control          PLAN:    1. Blood work discussed with patient. We will encourage diet and exercise. Recheck blood work in 3 months. Hopefully she will not have need for steroids again for a while. Will consider adding long-acting insulin when necessary  2. Continue medication therapy. 3. Continue Synthroid.   Follow-up in 3 months with fasting blood work 1st.

## 2019-05-10 ENCOUNTER — TELEPHONE (OUTPATIENT)
Dept: FAMILY MEDICINE CLINIC | Age: 84
End: 2019-05-10

## 2019-05-10 DIAGNOSIS — M79.605 PAIN IN BOTH LOWER EXTREMITIES: ICD-10-CM

## 2019-05-10 DIAGNOSIS — E11.42 TYPE 2 DIABETES MELLITUS WITH DIABETIC POLYNEUROPATHY, WITHOUT LONG-TERM CURRENT USE OF INSULIN (HCC): ICD-10-CM

## 2019-05-10 DIAGNOSIS — R60.0 LOWER EXTREMITY EDEMA: ICD-10-CM

## 2019-05-10 DIAGNOSIS — M79.604 PAIN IN BOTH LOWER EXTREMITIES: ICD-10-CM

## 2019-05-10 NOTE — TELEPHONE ENCOUNTER
Sidra Nina called stating patient is having pain in her back and knees. She is also having some swelling in her feet. She is wanting to know if increasing the Nortriptyline would be a good idea.  The patient has already been taking 2 Tylenol in the am & pm.

## 2019-05-13 RX ORDER — NORTRIPTYLINE HYDROCHLORIDE 10 MG/1
20 CAPSULE ORAL NIGHTLY
Qty: 60 CAPSULE | Refills: 5 | Status: SHIPPED | OUTPATIENT
Start: 2019-05-13

## 2019-05-24 PROBLEM — E87.5 HYPERKALEMIA: Status: ACTIVE | Noted: 2019-01-01

## 2019-05-24 NOTE — ASSESSMENT & PLAN NOTE
No true allergic reaction to Repatha - had site reaction which may have been secondary to latex in injector. Will try a sample of Praluent and if tolerates, will send to specialty pharmacy (had trouble trying to get locally).

## 2019-05-24 NOTE — PROGRESS NOTES
Subjective:     Encounter Date:05/24/2019    Chief Complaint:    Patient ID: Mis Fontaine is a 83 y.o. female here today for 3 month cardiac follow-up. She is accompanied by her daughter.     HPI     Congestive Heart Failure      Additional comments: chronic diastolic, since last visit has been hospitalized with double pneumona and hyperkalemia March 2019 at [facility]. Remains SOA with normal activities, chronic nonproductive cough has not improved with switch from lisinopril to valsartan 2/2019. Chronic orthopnea - now using a hospital bed with the HOB elevated 5 inches and 2 pillows. No PND but wakes up coughing. Has refused to wear oxygen at night. Wears oxygen during the day with exertion. Edema comes and goes - usually RLE.               Coronary Artery Disease      Additional comments: no chest discomfort at rest or with exertion, [facility] providing PT and other services. Uses a rollator.               Hypertension      Additional comments: well controlled per home readings              Hyperlipidemia      Additional comments: had labs drawn earlier this year, didn't start Praluent because home health nurse told her she was allergic. She had a site reaction to Repatha in the past but has never tried Praluent.           Last edited by Tracey Feliciano APRN on 5/24/2019  1:32 PM. (History)        History:   Past Medical History:   Diagnosis Date   • Atherosclerosis of native coronary artery of native heart without angina pectoris    • Cardiac murmur, previously undiagnosed    • Chest pain     Unspecified   • CHF (congestive heart failure) (CMS/[facility])    • Chronic kidney disease    • Diabetes mellitus (CMS/[facility])    • Fatigue     Unspecified   • GERD (gastroesophageal reflux disease)    • Gout    • Hyperlipidemia     Unspecified hyperlipidemia type   • Hypertension     Essential benign   • Hypothyroidism    • Nocturnal hypoxia    • Osteoarthritis    • Osteoporosis    • Pneumonia     • SOB (shortness of breath)    • Statin intolerance      Past Surgical History:   Procedure Laterality Date   • CAROTID ENDARTERECTOMY     • CAROTID STENT  2015     L carotid stents x 2 - [facility], RX Acculink 7mm x 30 mm   • CARPAL TUNNEL RELEASE      Both   • CATARACT EXTRACTION     • CERVICAL FUSION     • CORONARY ARTERY BYPASS GRAFT  10/24/2013    S/P LIMA to LAD, SVT to OM, SVG to distal RCA, Dr. Mccray, [facility]   • KNEE ARTHROSCOPY      Right   • LUMBAR FUSION     • TUBAL ABDOMINAL LIGATION       Social History     Socioeconomic History   • Marital status:      Spouse name: Not on file   • Number of children: Not on file   • Years of education: Not on file   • Highest education level: Not on file   Tobacco Use   • Smoking status: Former Smoker     Packs/day: 1.50     Years: 30.00     Pack years: 45.00     Types: Cigarettes     Start date:      Last attempt to quit: 1992     Years since quittin.2   • Smokeless tobacco: Never Used   Substance and Sexual Activity   • Alcohol use: No   • Drug use: No   • Sexual activity: Not Currently     Partners: Male     Family History   Problem Relation Age of Onset   • Coronary artery disease Father    • Hypertension Father    • Stroke Father         Completed Stroke   • Asthma Other    • Thyroid disease Other    • Hyperlipidemia Other        Outpatient Medications Marked as Taking for the 19 encounter (Office Visit) with Tracey Feliciano APRN   Medication Sig Dispense Refill   • Acetaminophen (TYLENOL 8 HOUR ARTHRITIS PAIN PO) Take 1-2 tablets by mouth 2 (Two) Times a Day.     • albuterol (PROVENTIL) (2.5 MG/3ML) 0.083% nebulizer solution Take 2.5 mg by nebulization 3 (Three) Times a Day.     • Alirocumab 75 MG/ML solution pen-injector Inject 1 pen under the skin into the appropriate area as directed Every 14 (Fourteen) Days. 6 pen 3   • allopurinol (ZYLOPRIM) 300 MG tablet Take 300 mg by mouth Daily.     • amLODIPine (NORVASC) 10 MG tablet  Take 10 mg by mouth Daily.     • aspirin 81 MG chewable tablet Chew 81 mg Daily.     • budesonide-formoterol (SYMBICORT) 160-4.5 MCG/ACT inhaler Inhale 2 puffs 2 (Two) Times a Day.     • clopidogrel (PLAVIX) 75 MG tablet Take 75 mg by mouth Daily.     • ferrous sulfate 325 (65 FE) MG tablet Take 325 mg by mouth Daily With Breakfast.     • furosemide (LASIX) 40 MG tablet Take 1-2 tablets by mouth 2 (Two) Times a Day. (Patient taking differently: Take 40 mg by mouth 2 (Two) Times a Day.) 120 tablet 11   • gabapentin (NEURONTIN) 100 MG capsule Take 100 mg by mouth Daily.     • glipiZIDE (GLUCOTROL) 5 MG tablet Take 5 mg by mouth Daily.     • levothyroxine (SYNTHROID, LEVOTHROID) 150 MCG tablet Take 150 mcg by mouth Daily.     • magnesium oxide (MAG-OX) 400 MG tablet Take 400 mg by mouth Daily.     • metoprolol succinate XL (TOPROL-XL) 50 MG 24 hr tablet Take 1 tablet by mouth 3 (Three) Times a Day. 90 tablet 11   • nortriptyline (PAMELOR) 10 MG capsule Take 10 mg by mouth Every Night. 20MG BID     • O2 (OXYGEN) Inhale 2 L/min Continuous.     • Omega-3 Fatty Acids (OMEGA-3 FISH OIL) 1000 MG capsule Take  by mouth.     • omeprazole OTC (PriLOSEC OTC) 20 MG EC tablet Take 20 mg by mouth Daily.     • predniSONE (DELTASONE) 5 MG tablet Take 5 mg by mouth Daily.     • SITagliptin (JANUVIA) 50 MG tablet Take 50 mg by mouth Daily.     • valsartan (DIOVAN) 80 MG tablet Take 1 tablet by mouth Daily. 30 tablet 11   • VITAMIN B1-B12 IJ Inject 1,000 Units as directed Every 28 (Twenty-Eight) Days.         Review of Systems:  Review of Systems   Constitution: Positive for decreased appetite, weakness and malaise/fatigue. Negative for chills and fever.   HENT: Negative for congestion and nosebleeds.    Eyes: Positive for blurred vision. Negative for double vision.   Cardiovascular: Positive for dyspnea on exertion and leg swelling (improved). Negative for chest pain (occ ), irregular heartbeat, near-syncope, palpitations and syncope.  "  Respiratory: Positive for cough (hospital stay in March for pneumonia) and shortness of breath. Negative for wheezing.    Endocrine: Negative for cold intolerance and heat intolerance.   Hematologic/Lymphatic: Bruises/bleeds easily.   Skin: Positive for dry skin. Negative for itching and rash (under L breast).   Musculoskeletal: Positive for arthritis, back pain, falls (11/20/2018 ), gout, joint pain (knees especially), joint swelling, muscle cramps, neck pain and stiffness. Negative for muscle weakness (legs).   Gastrointestinal: Negative for abdominal pain, constipation, diarrhea, heartburn, melena, nausea and vomiting.   Genitourinary: Positive for frequency and nocturia (3 to 4 times a night ). Negative for dysuria and hematuria.   Neurological: Positive for excessive daytime sleepiness, light-headedness (occ ), loss of balance and numbness (legs ,and left hand and fingers). Negative for dizziness and headaches.   Psychiatric/Behavioral: Negative for depression. The patient has insomnia. The patient is not nervous/anxious.             Objective:   /74 (BP Location: Left arm, Patient Position: Sitting, Cuff Size: Adult)   Pulse 51   Ht 144.8 cm (57\")   Wt 66.2 kg (146 lb)   SpO2 96%   BMI 31.59 kg/m²   Wt Readings from Last 3 Encounters:   05/24/19 66.2 kg (146 lb)   02/22/19 64.5 kg (142 lb 3.2 oz)   11/20/18 66 kg (145 lb 9.6 oz)         Physical Exam   Constitutional: She is oriented to person, place, and time. She appears well-developed and well-nourished.   obese   Eyes: No scleral icterus.   Neck: No JVD present.   Cardiovascular: Normal rate, regular rhythm, normal heart sounds and intact distal pulses. Exam reveals no gallop and no friction rub.   No murmur heard.  Pulmonary/Chest: Effort normal. She has decreased breath sounds.   Abdominal:   obese   Musculoskeletal: She exhibits edema (trace RLE) and tenderness.   Neurological: She is alert and oriented to person, place, and time.   Skin: " Skin is warm and dry.   Psychiatric: She has a normal mood and affect.   Vitals reviewed.      Lab/Diagnostics Review:   3/25/2019  CBC WBC 14,600 hemoglobin 11.9 hematocrit 38.9% platelets 235,000  CMP sodium 138 potassium 4.8 chloride 92 CO2 35 BUN 37 creatinine 1.5 glucose 142  Hemoglobin A1c 8.4%  Lipid panel total cholesterol 231 triglycerides 270 HDL 54     1/22/2019 (care everywhere)  CBC WBC 14,900 hemoglobin 15.9 hematocrit 52.5% platelets 319,000  BMP sodium 143 potassium 5.0 chloride 93 CO2 25 BUN 45 creatinine 1.8 calcium 10.4 glucose 257 total protein 7.9  Hepatic function panel alk phos 143 AST 10 ALT less than 5     1/11/2019 (care everywhere)   Lipid panel total cholesterol 228 triglycerides 180 HDL 61   TSH 1.03     10/10/2018  CBC WBC 8500, hemoglobin 16.1, hematocrit 53.7%, platelets 262,000  BMP sodium 140, potassium 3.8, chloride 100, CO2 34.7, BUN 26, creatinine 1.5, albumin 3.8, calcium 9.5, glucose 101, phosphorus 3.8, uric acid 14     09/18/2018 echocardiogram EF 55-60%, grade 1 diastolic dysfunction, mild MR, mild RVE, RVSP elevated at 40-45 mmHg, mild JILLIAN, mild to moderate TR and mild PI      9/14/2018 NT-proBNP 7596     02/22/2018  CMP sodium 141, potassium 5.1, chloride 102, CO2 33, BUN 23, creatinine 1.2, calcium 10.0, glucose 128, total protein 7.6, albumin 3.7, total bilirubin 0.6, alkaline phosphatase 160, AST 13, ALT 16  Hemoglobin A1c 7.4%  TSH 3.91     02/04/2016  CBC WBC 8.9, Hgb 12.9, Hct 43.4, platelets 253,000  CMP sodium 142, chloride 103, potassium 6.1, CO2 34.1, calcium 9.3, glucose 122, alkaline phosphatase 183, AST 13, ALT 22, albumin 3.8, total protein 7.1  TSH 1.14  Lipid Panel , , HDL 43,   Mg 2.2, Hgb A1C 7.3%      01/13/2016 EKG sinus rhythm 61 beats, right axis deviation,nonspecific intraventricular block, non-specific T-wave abnormality, no previous for comparison      2/11/2015 Lipid Panel total cholesterol 205, triglyceride 255,  HDL 39,    10/15/2015 TSH 1.12      04/13/2015 PFTs mild obstruction, no significant bronchodilator response, and normal lung volumes, increased airway resistance, moderately reduced diffusion capacity.       07/31/2013 Lexiscan sestamibi small apical defect consistent with ischemia, EF 95%      ECG 12 Lead  Date/Time: 3/5/2018 1:13 PM  Performed by: SASHA NAVAS  Authorized by: SASHA NAVAS   Comparison: compared with previous ECG from 1/13/2016  Comparison to previous ECG: Nonspecific intraventricular block replaced by R BBB  Rhythm: sinus rhythm  Rhythm comments: with sinus arrhythmia  Rate: normal  BPM: 68  Conduction: right bundle branch block  Clinical impression: abnormal ECG       ECG 12 Lead  Date/Time: 9/14/2018 2:04 PM  Performed by: SASHA NAVAS  Authorized by: SASHA NAVAS   Comparison: compared with previous ECG from 3/5/2018  Comparison to previous ECG: Inferolateral ST/T wave depressions have worsened, possible septal infarct  Rhythm: sinus rhythm  Rate: normal  BPM: 89  Conduction: right bundle branch block  Other findings: LAE  Clinical impression: abnormal ECG    Procedures: none in office        Assessment/Plan:         Problem List Items Addressed This Visit        Cardiovascular and Mediastinum    Atherosclerosis of native coronary artery of native heart without angina pectoris (Chronic)    Current Assessment & Plan     Stable without angina. Continue aspirin and Plavix.          Hypertension (Chronic)    Current Assessment & Plan     Well controlled with heart medications per home readings. Continue present therapy.          Mixed hyperlipidemia (Chronic)    Current Assessment & Plan     No true allergic reaction to Repatha - had site reaction which may have been secondary to latex in injector. Will try a sample of Praluent and if tolerates, will send to specialty pharmacy (had trouble trying to get locally).          Chronic diastolic heart failure  (CMS/[facility]) - Primary (Chronic)    Current Assessment & Plan     Class II-IIIa, Stage C. Compensated. SOA multifactorial with chronic lung disease, deconditioning, and obesity. Encouraged gradual increase in activity.             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           Tracey Feliciano, APRN, ACNP-BC, CHFN-BC

## 2019-05-24 NOTE — ASSESSMENT & PLAN NOTE
Class II-IIIa, Stage C. Compensated. SOA multifactorial with chronic lung disease, deconditioning, and obesity. Encouraged gradual increase in activity.

## 2019-05-28 NOTE — TELEPHONE ENCOUNTER
----- Message from GIO Harris sent at 5/28/2019  1:45 PM CDT -----  She had an injection site reaction to Repatha, which I think may be related to their pen having latex in it. It is possible she will have a reaction to Praluent but she did not have a typical all over drug rash. I discussed at length with her and daughter at last visit. TX!    ----- Message -----  From: Kinza Yen MA  Sent: 5/28/2019   1:26 PM  To: GIO Harris    Spoke to pt.  She has appt with Dr. Zavala tomorrow, will discuss Vitamin D then.  She wanted to make sure you were aware she had a reaction to Repatha in the past.  We have not received Praluent samples yet.  I have a call in to the rep for samples.  She understood I will call her once we get samples in and she can come by to get injection.

## 2019-06-04 ENCOUNTER — HOSPITAL ENCOUNTER (OUTPATIENT)
Dept: GENERAL RADIOLOGY | Age: 84
Discharge: HOME | End: 2019-06-04
Payer: MEDICARE

## 2019-06-04 ENCOUNTER — OFFICE VISIT (OUTPATIENT)
Dept: FAMILY MEDICINE CLINIC | Age: 84
End: 2019-06-04
Payer: MEDICARE

## 2019-06-04 VITALS
BODY MASS INDEX: 32.03 KG/M2 | DIASTOLIC BLOOD PRESSURE: 72 MMHG | HEART RATE: 66 BPM | SYSTOLIC BLOOD PRESSURE: 122 MMHG | OXYGEN SATURATION: 94 % | WEIGHT: 148 LBS | TEMPERATURE: 97 F

## 2019-06-04 DIAGNOSIS — M10.9 ACUTE GOUT OF RIGHT WRIST, UNSPECIFIED CAUSE: ICD-10-CM

## 2019-06-04 DIAGNOSIS — M25.561 CHRONIC PAIN OF RIGHT KNEE: ICD-10-CM

## 2019-06-04 DIAGNOSIS — J44.1 COPD WITH ACUTE EXACERBATION (HCC): Primary | ICD-10-CM

## 2019-06-04 DIAGNOSIS — J44.1 COPD WITH ACUTE EXACERBATION (HCC): ICD-10-CM

## 2019-06-04 DIAGNOSIS — G89.29 CHRONIC PAIN OF RIGHT KNEE: ICD-10-CM

## 2019-06-04 PROCEDURE — 73562 X-RAY EXAM OF KNEE 3: CPT

## 2019-06-04 PROCEDURE — 99214 OFFICE O/P EST MOD 30 MIN: CPT | Performed by: FAMILY MEDICINE

## 2019-06-04 PROCEDURE — 71046 X-RAY EXAM CHEST 2 VIEWS: CPT

## 2019-06-04 RX ORDER — AMOXICILLIN AND CLAVULANATE POTASSIUM 500; 125 MG/1; MG/1
1 TABLET, FILM COATED ORAL 3 TIMES DAILY
Qty: 30 TABLET | Refills: 0 | Status: SHIPPED | OUTPATIENT
Start: 2019-06-04 | End: 2019-06-14

## 2019-06-04 RX ORDER — METHYLPREDNISOLONE 4 MG/1
TABLET ORAL
Qty: 1 KIT | Refills: 0 | Status: SHIPPED | OUTPATIENT
Start: 2019-06-04 | End: 2019-06-10

## 2019-06-04 ASSESSMENT — ENCOUNTER SYMPTOMS
ALLERGIC/IMMUNOLOGIC NEGATIVE: 1
GASTROINTESTINAL NEGATIVE: 1
EYES NEGATIVE: 1
COUGH: 1

## 2019-06-04 NOTE — PROGRESS NOTES
SUBJECTIVE:    Patient ID: Osito Mayorga is a 82 y. o.female. HPI:   here for follow-up of multiple medical problems. Patient is a 29-year-old white female. She complain of couple of history of progressively worsening cough. Cough is productive. Cough is worse at night. She takes Diovan. Stopped taking ACE inhibitor secondary to cough also. She doesn't smoke. She have history of pneumonias. She also complained of gout in the right hand. Right hand is swell and tender. Is also red. Denies any trauma. She complain of right knee pain. Her right knee gives out then she falls. She have multiple falls secondary to right knee giving out. Past Medical History:   Diagnosis Date    Acid reflux     Arthritis     Blood circulation, collateral     CAD (coronary artery disease)     Carotid artery stenosis     [facility]) [facility])     COPD (chronic obstructive pulmonary disease) ([facility])     Diabetes ([facility])     type 2    Gout     Hyperlipidemia     Hypertension     Hypothyroidism     Renal insufficiency     Restless legs syndrome       Current Outpatient Medications   Medication Sig Dispense Refill    methylPREDNISolone (MEDROL DOSEPACK) 4 MG tablet Take by mouth.  1 kit 0    amoxicillin-clavulanate (AUGMENTIN) 500-125 MG per tablet Take 1 tablet by mouth 3 times daily for 10 days 30 tablet 0    Elastic Bandages & Supports (KNEE BRACE ADJUSTABLE HINGED) MISC Use while awake 1 each 0    nortriptyline (PAMELOR) 10 MG capsule Take 2 capsules by mouth nightly 60 capsule 5    SITagliptin (JANUVIA) 50 MG tablet Take 1 tablet by mouth daily 30 tablet [address] by Does not apply route 1 each 0    albuterol (PROVENTIL) (2.5 MG/3ML) 0.083% nebulizer solution Take 3 mLs by nebulization 4 times daily DX: J44.1 120 each 3    valsartan (DIOVAN) 80 MG tablet Take 80 mg by mouth      Nebulizers (AIRIAL COMPACT MINI NEBULIZER) MISC Qid prn 1 each 0    amLODIPine (NORVASC) 10 MG tablet Take 1 tablet by mouth daily 30 tablet 5    Omega-3 Fatty Acids (OMEGA 3 PO) Take by mouth      Cyanocobalamin (B-12 IJ) Inject as directed every 30 days      Lift Chair MISC by Does not apply route 1 each 0    Elastic Bandages & Supports (MEDICAL COMPRESSION STOCKINGS) MISC 1 each by Does not apply route daily 20-30 mmHg  Sized to fit 1 each 5    ferrous sulfate 325 (65 Fe) MG tablet Take 325 mg by mouth daily      allopurinol (ZYLOPRIM) 300 MG tablet Take 300 mg by mouth daily      budesonide-formoterol (SYMBICORT) 160-4.5 MCG/ACT AERO Inhale 1 puff into the lungs 2 times daily       OXYGEN Inhale 2 L into the lungs daily      levothyroxine (SYNTHROID) 137 MCG tablet Take 137 mcg by mouth Daily       aspirin 81 MG chewable tablet Take 81 mg by mouth daily      clopidogrel (PLAVIX) 75 MG tablet Take 75 mg by mouth daily      furosemide (LASIX) 40 MG tablet Take 40 mg by mouth 3 times daily       magnesium oxide (MAG-OX) 400 MG tablet Take 400 mg by mouth daily      metoprolol (TOPROL-XL) 50 MG XL tablet Take 50 mg by mouth 3 times daily       omeprazole (PRILOSEC OTC) 20 MG tablet Take 20.6 mg by mouth       No current facility-administered medications for this visit. Allergies   Allergen Reactions    Statins      Lose of feeling in legs.  Valium [Diazepam]        Review of Systems   Constitutional: Negative. HENT: Negative. Eyes: Negative. Respiratory: Positive for cough. Cardiovascular: Negative. Gastrointestinal: Negative. Endocrine: Negative. Genitourinary: Negative. Musculoskeletal: Positive for arthralgias. Skin: Negative. Allergic/Immunologic: Negative. Neurological: Negative. Hematological: Negative. Psychiatric/Behavioral: Negative. OBJECTIVE:    Physical Exam   Constitutional: She is oriented to person, place, and time. Vital signs are normal. She appears well-developed and well-nourished. She is active.    HENT:   Head: Normocephalic and

## 2019-06-05 NOTE — TELEPHONE ENCOUNTER
----- Message from GIO Harris sent at 5/24/2019  1:42 PM CDT -----  Regarding: Praluent  See if we can get some Praluent samples from the rep. Then call her when get a 75 mg sample so she can try before we try to order again. TX!

## 2019-06-10 NOTE — TELEPHONE ENCOUNTER
Pt in office today for Praluent injection training.    Praluent 75mg/mL given  Left arm  LOT [id number]  EXP 11/30/2019  She is going to call in a few days with update.

## 2019-06-12 NOTE — TELEPHONE ENCOUNTER
Pt wants to go ahead and order pralulent.  She said that she did not have a reaction to the 1st injection and would like to continue.

## 2019-06-12 NOTE — TELEPHONE ENCOUNTER
Sent Praluent RX to [facility] in [place], [place]. She may be able to  in store or they may be able to ship to her but she needs to call [facility] if she doesn't hear from them soon. We may need to do a prior auth. TX!

## 2019-06-13 ENCOUNTER — TELEPHONE (OUTPATIENT)
Dept: FAMILY MEDICINE CLINIC | Age: 84
End: 2019-06-13

## 2019-06-13 DIAGNOSIS — E03.9 HYPOTHYROIDISM, UNSPECIFIED TYPE: Primary | ICD-10-CM

## 2019-06-13 DIAGNOSIS — I25.10 CORONARY ARTERY DISEASE INVOLVING NATIVE HEART WITHOUT ANGINA PECTORIS, UNSPECIFIED VESSEL OR LESION TYPE: ICD-10-CM

## 2019-06-13 RX ORDER — LEVOTHYROXINE SODIUM 137 UG/1
137 TABLET ORAL DAILY
Qty: 90 TABLET | Refills: 1 | Status: SHIPPED | OUTPATIENT
Start: 2019-06-13 | End: 2019-08-07 | Stop reason: DRUGHIGH

## 2019-06-13 RX ORDER — CLOPIDOGREL BISULFATE 75 MG/1
75 TABLET ORAL DAILY
Qty: 90 TABLET | Refills: 3 | Status: SHIPPED | OUTPATIENT
Start: 2019-06-13 | End: 2019-08-21 | Stop reason: ALTCHOICE

## 2019-06-27 ENCOUNTER — TELEPHONE (OUTPATIENT)
Dept: FAMILY MEDICINE CLINIC | Age: 84
End: 2019-06-27

## 2019-07-08 ENCOUNTER — OFFICE VISIT (OUTPATIENT)
Dept: FAMILY MEDICINE CLINIC | Age: 84
End: 2019-07-08
Payer: MEDICARE

## 2019-07-08 VITALS
SYSTOLIC BLOOD PRESSURE: 118 MMHG | BODY MASS INDEX: 29.99 KG/M2 | TEMPERATURE: 97.5 F | OXYGEN SATURATION: 82 % | DIASTOLIC BLOOD PRESSURE: 62 MMHG | HEART RATE: 75 BPM | WEIGHT: 138.6 LBS

## 2019-07-08 DIAGNOSIS — E11.42 TYPE 2 DIABETES MELLITUS WITH DIABETIC POLYNEUROPATHY, WITHOUT LONG-TERM CURRENT USE OF INSULIN (HCC): Primary | ICD-10-CM

## 2019-07-08 DIAGNOSIS — E78.5 HYPERLIPIDEMIA, UNSPECIFIED HYPERLIPIDEMIA TYPE: ICD-10-CM

## 2019-07-08 DIAGNOSIS — I10 ESSENTIAL HYPERTENSION: ICD-10-CM

## 2019-07-08 PROCEDURE — 99214 OFFICE O/P EST MOD 30 MIN: CPT | Performed by: FAMILY MEDICINE

## 2019-07-08 ASSESSMENT — ENCOUNTER SYMPTOMS
EYES NEGATIVE: 1
GASTROINTESTINAL NEGATIVE: 1
ALLERGIC/IMMUNOLOGIC NEGATIVE: 1
RESPIRATORY NEGATIVE: 1

## 2019-07-08 NOTE — PROGRESS NOTES
are normal.   Neck: Normal range of motion and full passive range of motion without pain. Neck supple. Cardiovascular: Normal rate, regular rhythm, S1 normal, S2 normal and normal pulses. Murmur heard. Pulmonary/Chest: Effort normal. She has decreased breath sounds. Patient oxygen   Abdominal: Soft. Normal appearance and bowel sounds are normal.   Musculoskeletal: Normal range of motion. She exhibits edema. Right wrist: She exhibits tenderness and swelling. Neurological: She is alert and oriented to person, place, and time. She has normal strength and normal reflexes. Lower extremities strength 4 out of 5 bilateral   Skin: Skin is warm, dry and intact. Psychiatric: She has a normal mood and affect. Her speech is normal and behavior is normal. Judgment and thought content normal. Cognition and memory are normal.      /62 (Site: Left Upper Arm, Position: Sitting, Cuff Size: Medium Adult)   Pulse 75   Temp 97.5 °F (36.4 °C) (Oral)   Wt 138 lb 9.6 oz (62.9 kg)   SpO2 (!) 82%   BMI 29.99 kg/m²      ASSESSMENT:     Diagnosis Orders   1. Type 2 diabetes mellitus with diabetic polyneuropathy, without long-term current use of insulin (HCC)-well-controlled CBC    Comprehensive Metabolic Panel    Hemoglobin A1C    Lipid Panel    MICROALBUMIN, RANDOM URINE (W/O CREATININE)    Urinalysis   2. Essential hypertension-well-controlled    3. Hyperlipidemia, unspecified hyperlipidemia type-stable         PLAN:    1. Continue same regimen. Blood work discussed with patient. 2.  Continue blood pressure medications. 3.  Follow-up with cardiology. Continue medications. Encourage diet and exercise.   Follow-up 3 months fasting blood work first.

## 2019-07-29 ENCOUNTER — HOSPITAL ENCOUNTER (INPATIENT)
Age: 84
LOS: 2 days | Discharge: HOSPICE | DRG: 091 | End: 2019-07-31
Attending: EMERGENCY MEDICINE | Admitting: HOSPITALIST
Payer: MEDICARE

## 2019-07-29 ENCOUNTER — APPOINTMENT (OUTPATIENT)
Dept: CT IMAGING | Age: 84
DRG: 091 | End: 2019-07-29
Payer: MEDICARE

## 2019-07-29 ENCOUNTER — APPOINTMENT (OUTPATIENT)
Dept: GENERAL RADIOLOGY | Age: 84
DRG: 091 | End: 2019-07-29
Payer: MEDICARE

## 2019-07-29 DIAGNOSIS — E87.1 HYPONATREMIA: ICD-10-CM

## 2019-07-29 DIAGNOSIS — N17.9 AKI (ACUTE KIDNEY INJURY) (HCC): ICD-10-CM

## 2019-07-29 DIAGNOSIS — R91.8 PULMONARY INFILTRATE ON CHEST X-RAY: ICD-10-CM

## 2019-07-29 DIAGNOSIS — R41.82 ALTERED MENTAL STATUS, UNSPECIFIED ALTERED MENTAL STATUS TYPE: Primary | ICD-10-CM

## 2019-07-29 DIAGNOSIS — R79.89 ABNORMAL TSH: ICD-10-CM

## 2019-07-29 PROBLEM — R41.0 MENTAL CONFUSION: Status: ACTIVE | Noted: 2019-07-29

## 2019-07-29 LAB
ALBUMIN SERPL-MCNC: 4 G/DL (ref 3.5–5.2)
ALP BLD-CCNC: 178 U/L (ref 35–104)
ALT SERPL-CCNC: 159 U/L (ref 5–33)
AMMONIA: 25 UMOL/L (ref 11–51)
ANION GAP SERPL CALCULATED.3IONS-SCNC: 16 MMOL/L (ref 7–19)
AST SERPL-CCNC: 41 U/L (ref 5–32)
BASE EXCESS ARTERIAL: -1.2 MMOL/L (ref -2–2)
BASOPHILS ABSOLUTE: 0 K/UL (ref 0–0.2)
BASOPHILS RELATIVE PERCENT: 0.3 % (ref 0–1)
BILIRUB SERPL-MCNC: 0.7 MG/DL (ref 0.2–1.2)
BILIRUBIN URINE: NEGATIVE
BLOOD, URINE: NEGATIVE
BUN BLDV-MCNC: 61 MG/DL (ref 8–23)
CALCIUM SERPL-MCNC: 8.8 MG/DL (ref 8.8–10.2)
CARBOXYHEMOGLOBIN ARTERIAL: 2 % (ref 0–5)
CHLORIDE BLD-SCNC: 88 MMOL/L (ref 98–111)
CLARITY: ABNORMAL
CO2: 25 MMOL/L (ref 22–29)
COLOR: YELLOW
CREAT SERPL-MCNC: 3.4 MG/DL (ref 0.5–0.9)
EKG P AXIS: 70 DEGREES
EKG P-R INTERVAL: 222 MS
EKG Q-T INTERVAL: 500 MS
EKG QRS DURATION: 156 MS
EKG QTC CALCULATION (BAZETT): 483 MS
EKG T AXIS: -25 DEGREES
EOSINOPHILS ABSOLUTE: 0 K/UL (ref 0–0.6)
EOSINOPHILS RELATIVE PERCENT: 0.2 % (ref 0–5)
GFR NON-AFRICAN AMERICAN: 13
GLUCOSE BLD-MCNC: 147 MG/DL (ref 74–109)
GLUCOSE BLD-MCNC: 149 MG/DL (ref 70–99)
GLUCOSE URINE: NEGATIVE MG/DL
HBA1C MFR BLD: 8.3 % (ref 4–6)
HCO3 ARTERIAL: 25.7 MMOL/L (ref 22–26)
HCT VFR BLD CALC: 41.1 % (ref 37–47)
HEMOGLOBIN, ART, EXTENDED: 12.8 G/DL (ref 12–16)
HEMOGLOBIN: 12.5 G/DL (ref 12–16)
KETONES, URINE: NEGATIVE MG/DL
LACTIC ACID: 0.8 MMOL/L (ref 0.5–1.9)
LEUKOCYTE ESTERASE, URINE: NEGATIVE
LYMPHOCYTES ABSOLUTE: 0.5 K/UL (ref 1.1–4.5)
LYMPHOCYTES RELATIVE PERCENT: 4.4 % (ref 20–40)
MCH RBC QN AUTO: 27.7 PG (ref 27–31)
MCHC RBC AUTO-ENTMCNC: 30.4 G/DL (ref 33–37)
MCV RBC AUTO: 90.9 FL (ref 81–99)
METHEMOGLOBIN ARTERIAL: 0.8 %
MONOCYTES ABSOLUTE: 0.5 K/UL (ref 0–0.9)
MONOCYTES RELATIVE PERCENT: 4.9 % (ref 0–10)
NEUTROPHILS ABSOLUTE: 9.6 K/UL (ref 1.5–7.5)
NEUTROPHILS RELATIVE PERCENT: 89.1 % (ref 50–65)
NITRITE, URINE: NEGATIVE
O2 CONTENT ARTERIAL: 16.2 ML/DL
O2 SAT, ARTERIAL: 90.1 %
O2 THERAPY: ABNORMAL
PCO2 ARTERIAL: 51 MMHG (ref 35–45)
PDW BLD-RTO: 16.2 % (ref 11.5–14.5)
PERFORMED ON: ABNORMAL
PH ARTERIAL: 7.31 (ref 7.35–7.45)
PH UA: 5.5 (ref 5–8)
PLATELET # BLD: 270 K/UL (ref 130–400)
PMV BLD AUTO: 11.3 FL (ref 9.4–12.3)
PO2 ARTERIAL: 64 MMHG (ref 80–100)
POTASSIUM REFLEX MAGNESIUM: 5.4 MMOL/L (ref 3.5–5)
POTASSIUM, WHOLE BLOOD: 4.9
PROTEIN UA: NEGATIVE MG/DL
RBC # BLD: 4.52 M/UL (ref 4.2–5.4)
SODIUM BLD-SCNC: 129 MMOL/L (ref 136–145)
SPECIFIC GRAVITY UA: 1.02 (ref 1–1.03)
TOTAL PROTEIN: 6.2 G/DL (ref 6.6–8.7)
TSH SERPL DL<=0.05 MIU/L-ACNC: 13.9 UIU/ML (ref 0.27–4.2)
URINE REFLEX TO CULTURE: ABNORMAL
UROBILINOGEN, URINE: 0.2 E.U./DL
VITAMIN B-12: >2000 PG/ML (ref 211–946)
WBC # BLD: 10.7 K/UL (ref 4.8–10.8)

## 2019-07-29 PROCEDURE — 87040 BLOOD CULTURE FOR BACTERIA: CPT

## 2019-07-29 PROCEDURE — 94640 AIRWAY INHALATION TREATMENT: CPT

## 2019-07-29 PROCEDURE — 6360000002 HC RX W HCPCS: Performed by: EMERGENCY MEDICINE

## 2019-07-29 PROCEDURE — 82607 VITAMIN B-12: CPT

## 2019-07-29 PROCEDURE — 2140000000 HC CCU INTERMEDIATE R&B

## 2019-07-29 PROCEDURE — 81003 URINALYSIS AUTO W/O SCOPE: CPT

## 2019-07-29 PROCEDURE — 83036 HEMOGLOBIN GLYCOSYLATED A1C: CPT

## 2019-07-29 PROCEDURE — 2700000000 HC OXYGEN THERAPY PER DAY

## 2019-07-29 PROCEDURE — 2580000003 HC RX 258: Performed by: HOSPITALIST

## 2019-07-29 PROCEDURE — 84132 ASSAY OF SERUM POTASSIUM: CPT

## 2019-07-29 PROCEDURE — 80053 COMPREHEN METABOLIC PANEL: CPT

## 2019-07-29 PROCEDURE — 82140 ASSAY OF AMMONIA: CPT

## 2019-07-29 PROCEDURE — 6370000000 HC RX 637 (ALT 250 FOR IP): Performed by: EMERGENCY MEDICINE

## 2019-07-29 PROCEDURE — 85025 COMPLETE CBC W/AUTO DIFF WBC: CPT

## 2019-07-29 PROCEDURE — 6360000002 HC RX W HCPCS: Performed by: HOSPITALIST

## 2019-07-29 PROCEDURE — 2580000003 HC RX 258: Performed by: EMERGENCY MEDICINE

## 2019-07-29 PROCEDURE — 96375 TX/PRO/DX INJ NEW DRUG ADDON: CPT

## 2019-07-29 PROCEDURE — 71045 X-RAY EXAM CHEST 1 VIEW: CPT

## 2019-07-29 PROCEDURE — 82803 BLOOD GASES ANY COMBINATION: CPT

## 2019-07-29 PROCEDURE — 82948 REAGENT STRIP/BLOOD GLUCOSE: CPT

## 2019-07-29 PROCEDURE — 96365 THER/PROPH/DIAG IV INF INIT: CPT

## 2019-07-29 PROCEDURE — 99223 1ST HOSP IP/OBS HIGH 75: CPT | Performed by: HOSPITALIST

## 2019-07-29 PROCEDURE — 36600 WITHDRAWAL OF ARTERIAL BLOOD: CPT

## 2019-07-29 PROCEDURE — 84443 ASSAY THYROID STIM HORMONE: CPT

## 2019-07-29 PROCEDURE — 99285 EMERGENCY DEPT VISIT HI MDM: CPT | Performed by: EMERGENCY MEDICINE

## 2019-07-29 PROCEDURE — 83605 ASSAY OF LACTIC ACID: CPT

## 2019-07-29 PROCEDURE — 36415 COLL VENOUS BLD VENIPUNCTURE: CPT

## 2019-07-29 PROCEDURE — 93005 ELECTROCARDIOGRAM TRACING: CPT

## 2019-07-29 PROCEDURE — 6370000000 HC RX 637 (ALT 250 FOR IP): Performed by: HOSPITALIST

## 2019-07-29 PROCEDURE — 99285 EMERGENCY DEPT VISIT HI MDM: CPT

## 2019-07-29 PROCEDURE — 70450 CT HEAD/BRAIN W/O DYE: CPT

## 2019-07-29 RX ORDER — AMLODIPINE BESYLATE 5 MG/1
10 TABLET ORAL DAILY
Status: DISCONTINUED | OUTPATIENT
Start: 2019-07-29 | End: 2019-07-31 | Stop reason: HOSPADM

## 2019-07-29 RX ORDER — FERROUS SULFATE 325(65) MG
325 TABLET ORAL DAILY
Status: DISCONTINUED | OUTPATIENT
Start: 2019-07-29 | End: 2019-07-31 | Stop reason: HOSPADM

## 2019-07-29 RX ORDER — ONDANSETRON 2 MG/ML
4 INJECTION INTRAMUSCULAR; INTRAVENOUS EVERY 6 HOURS PRN
Status: DISCONTINUED | OUTPATIENT
Start: 2019-07-29 | End: 2019-07-31 | Stop reason: HOSPADM

## 2019-07-29 RX ORDER — BUDESONIDE 0.5 MG/2ML
0.5 INHALANT ORAL 2 TIMES DAILY
Status: DISCONTINUED | OUTPATIENT
Start: 2019-07-29 | End: 2019-07-31 | Stop reason: HOSPADM

## 2019-07-29 RX ORDER — SODIUM CHLORIDE 9 MG/ML
INJECTION, SOLUTION INTRAVENOUS CONTINUOUS
Status: DISCONTINUED | OUTPATIENT
Start: 2019-07-29 | End: 2019-07-31 | Stop reason: HOSPADM

## 2019-07-29 RX ORDER — SODIUM CHLORIDE 0.9 % (FLUSH) 0.9 %
10 SYRINGE (ML) INJECTION PRN
Status: DISCONTINUED | OUTPATIENT
Start: 2019-07-29 | End: 2019-07-31 | Stop reason: HOSPADM

## 2019-07-29 RX ORDER — ASPIRIN 81 MG/1
81 TABLET, CHEWABLE ORAL DAILY
Status: DISCONTINUED | OUTPATIENT
Start: 2019-07-29 | End: 2019-07-31 | Stop reason: HOSPADM

## 2019-07-29 RX ORDER — CLOPIDOGREL BISULFATE 75 MG/1
75 TABLET ORAL DAILY
Status: DISCONTINUED | OUTPATIENT
Start: 2019-07-29 | End: 2019-07-31 | Stop reason: HOSPADM

## 2019-07-29 RX ORDER — BUDESONIDE AND FORMOTEROL FUMARATE DIHYDRATE 160; 4.5 UG/1; UG/1
2 AEROSOL RESPIRATORY (INHALATION) 2 TIMES DAILY
Status: DISCONTINUED | OUTPATIENT
Start: 2019-07-29 | End: 2019-07-29

## 2019-07-29 RX ORDER — SENNA PLUS 8.6 MG/1
1 TABLET ORAL DAILY PRN
Status: DISCONTINUED | OUTPATIENT
Start: 2019-07-29 | End: 2019-07-31 | Stop reason: HOSPADM

## 2019-07-29 RX ORDER — 0.9 % SODIUM CHLORIDE 0.9 %
1000 INTRAVENOUS SOLUTION INTRAVENOUS ONCE
Status: COMPLETED | OUTPATIENT
Start: 2019-07-29 | End: 2019-07-29

## 2019-07-29 RX ORDER — PANTOPRAZOLE SODIUM 40 MG/1
40 TABLET, DELAYED RELEASE ORAL EVERY MORNING
Status: DISCONTINUED | OUTPATIENT
Start: 2019-07-30 | End: 2019-07-31 | Stop reason: HOSPADM

## 2019-07-29 RX ORDER — ALBUTEROL SULFATE 2.5 MG/3ML
2.5 SOLUTION RESPIRATORY (INHALATION) 4 TIMES DAILY
Status: DISCONTINUED | OUTPATIENT
Start: 2019-07-29 | End: 2019-07-29

## 2019-07-29 RX ORDER — LEVOTHYROXINE SODIUM ANHYDROUS 100 UG/5ML
150 INJECTION, POWDER, LYOPHILIZED, FOR SOLUTION INTRAVENOUS DAILY
Status: DISCONTINUED | OUTPATIENT
Start: 2019-07-30 | End: 2019-07-30

## 2019-07-29 RX ORDER — IPRATROPIUM BROMIDE AND ALBUTEROL SULFATE 2.5; .5 MG/3ML; MG/3ML
1 SOLUTION RESPIRATORY (INHALATION) ONCE
Status: COMPLETED | OUTPATIENT
Start: 2019-07-29 | End: 2019-07-29

## 2019-07-29 RX ORDER — IPRATROPIUM BROMIDE AND ALBUTEROL SULFATE 2.5; .5 MG/3ML; MG/3ML
1 SOLUTION RESPIRATORY (INHALATION)
Status: DISCONTINUED | OUTPATIENT
Start: 2019-07-29 | End: 2019-07-31 | Stop reason: HOSPADM

## 2019-07-29 RX ORDER — SODIUM CHLORIDE 0.9 % (FLUSH) 0.9 %
10 SYRINGE (ML) INJECTION EVERY 12 HOURS SCHEDULED
Status: DISCONTINUED | OUTPATIENT
Start: 2019-07-29 | End: 2019-07-31 | Stop reason: HOSPADM

## 2019-07-29 RX ORDER — ACETAMINOPHEN 325 MG/1
650 TABLET ORAL EVERY 4 HOURS PRN
Status: DISCONTINUED | OUTPATIENT
Start: 2019-07-29 | End: 2019-07-31 | Stop reason: HOSPADM

## 2019-07-29 RX ORDER — ALLOPURINOL 100 MG/1
300 TABLET ORAL DAILY
Status: DISCONTINUED | OUTPATIENT
Start: 2019-07-29 | End: 2019-07-31 | Stop reason: HOSPADM

## 2019-07-29 RX ADMIN — CEFTRIAXONE 1 G: 1 INJECTION, POWDER, FOR SOLUTION INTRAMUSCULAR; INTRAVENOUS at 17:47

## 2019-07-29 RX ADMIN — IPRATROPIUM BROMIDE AND ALBUTEROL SULFATE 1 AMPULE: 2.5; .5 SOLUTION RESPIRATORY (INHALATION) at 19:49

## 2019-07-29 RX ADMIN — Medication 10 ML: at 20:26

## 2019-07-29 RX ADMIN — IPRATROPIUM BROMIDE AND ALBUTEROL SULFATE 1 AMPULE: .5; 3 SOLUTION RESPIRATORY (INHALATION) at 12:43

## 2019-07-29 RX ADMIN — ENOXAPARIN SODIUM 30 MG: 30 INJECTION SUBCUTANEOUS at 20:26

## 2019-07-29 RX ADMIN — SODIUM CHLORIDE 1000 ML: 9 INJECTION, SOLUTION INTRAVENOUS at 15:16

## 2019-07-29 RX ADMIN — BUDESONIDE 500 MCG: 0.5 INHALANT RESPIRATORY (INHALATION) at 19:49

## 2019-07-29 RX ADMIN — SODIUM CHLORIDE: 9 INJECTION, SOLUTION INTRAVENOUS at 20:26

## 2019-07-29 RX ADMIN — Medication 500 MG: at 17:47

## 2019-07-29 ASSESSMENT — ENCOUNTER SYMPTOMS
CHOKING: 0
BLOOD IN STOOL: 0
SORE THROAT: 0
DIARRHEA: 0
SHORTNESS OF BREATH: 0
CONSTIPATION: 0
NAUSEA: 0
SINUS PRESSURE: 0
EYE DISCHARGE: 0
FACIAL SWELLING: 0
VOICE CHANGE: 0
APNEA: 0

## 2019-07-29 ASSESSMENT — PAIN SCALES - GENERAL
PAINLEVEL_OUTOF10: 0

## 2019-07-29 NOTE — H&P
[id number] [facility]      Hospitalist - History & Physical      PCP: Merrill Barcenas MD    Date of Admission: 7/29/2019    Date of Service: 7/29/2019    Chief Complaint:  Ms change    History Of Present Illness: The patient is a 82 y.o. female with PMH of CAD, COPD O2 dependent, dementia, PVD s/p stenting , hypothyroidism ? Compliance with meds . Was brought in as mental status change. EMS reports brought in for low sat and bradycardia. Heart rate in the 50s. Patient is on home O2. She has a history of COPD, in ER head CT showed atrophy consistent with Dementia but no changes, CR was 3.4 baseline normal, TSH 13.9, CXR ? Pneumonia, ABG noted acute on chronic hypoxemic respiratory failure , was arousable and protecting her airways     Past Medical History:        Diagnosis Date    Acid reflux     Arthritis     Blood circulation, collateral     CAD (coronary artery disease)     Carotid artery stenosis     [facility]     COPD (chronic obstructive pulmonary disease) [facility])     Diabetes ([facility])     type 2    Gout     Hyperlipidemia     Hypertension     Hypothyroidism     Renal insufficiency     Restless legs syndrome        Past Surgical History:        Procedure Laterality Date    BACK SURGERY      CARDIAC SURGERY      CAROTID ENDARTERECTOMY      both sides    EYE SURGERY Bilateral     bilateral cataract surgery    HAND SURGERY      Both Hands     KNEE ARTHROSCOPY Right     TUBAL LIGATION      VASCULAR SURGERY Left 12/15/15 [facility]    left ICA stent    VASCULAR SURGERY  12/15/2015 [facility]    Procedure: ARCh aortogram, left carotis/ cerebral arteriograms, left ICA emboshield, left ICA/CCA stent (acculink 8-6 *40), left distal ICA stent (acculink 7* 30), mynx right CFA. Home Medications:  Prior to Admission medications    Medication Sig Start Date End Date Taking?  Authorizing Provider   levothyroxine (SYNTHROID) 137 MCG tablet Take 1 tablet by mouth Daily 6/13/19  Yes Reinaldo Maciel

## 2019-07-29 NOTE — ED PROVIDER NOTES
Relationships    Social connections:     Talks on phone: None     Gets together: None     Attends [employer] service: None     Active member of club or organization: None     Attends meetings of clubs or organizations: None     Relationship status: None    Intimate partner violence:     Fear of current or ex partner: None     Emotionally abused: None     Physically abused: None     Forced sexual activity: None   Other Topics Concern    None   Social History Narrative    None       SCREENINGS             PHYSICAL EXAM    (up to 7 for level 4, 8 or more for level 5)     ED Triage Vitals [07/29/19 1057]   BP Temp Temp src Pulse Resp SpO2 Height Weight   (!) 125/56 98.2 °F (36.8 °C) -- 58 20 91 % 5' 2\" (1.575 m) 140 lb (63.5 kg)       Physical Exam   Constitutional: She is oriented to person, place, and time. She appears well-developed. No distress. HENT:   Head: Normocephalic and atraumatic. Right Ear: External ear normal.   Left Ear: External ear normal.   Nose: Nose normal.   Mouth/Throat: Oropharynx is clear and moist.   Eyes: Pupils are equal, round, and reactive to light. Conjunctivae and EOM are normal. No scleral icterus. Neck: Normal range of motion. Neck supple. No JVD present. Cardiovascular: Normal rate, regular rhythm, normal heart sounds and intact distal pulses. No murmur heard. Pulmonary/Chest: Effort normal.   Decreased breath sounds. Abdominal: Soft. Bowel sounds are normal. There is no tenderness. Musculoskeletal: Normal range of motion. She exhibits no tenderness or deformity. Neurological: She is alert and oriented to person, place, and time. Skin: Skin is warm and dry. She is not diaphoretic. Psychiatric: She has a normal mood and affect. Her behavior is normal.   Nursing note and vitals reviewed.       DIAGNOSTIC RESULTS     EKG: All EKG's are interpreted by the Emergency Department Physician who either signs or Co-signs this chart in the absence of a cardiologist.    Sinus rhythm rate 51 appearance of right and left bundle branch blocks. RADIOLOGY:   Non-plain film images such as CT, Ultrasound and MRI are read by the radiologist. Plainradiographic images are visualized and preliminarily interpreted by the emergency physician with the below findings:    Reviewed the images and results. Interpretation per the Radiologist below, if available at the time of this note:    CT Head WO Contrast   Final Result   No acute intracranial abnormality. Chronic ischemic and atrophic changes. Right sphenoid sinusitis. The above findings are recorded on a digital voice clip in PACS. Signed by Dr Lew Greene on 7/29/2019 3:01 PM      XR CHEST PORTABLE   Final Result   1. Poor inspiration with vascular crowding. 2. Patchy infiltrate in the lung bases likely representing   atelectasis. Pneumonia and pulmonary edema less likely. 3. Cardiomegaly. Prior median sternotomy.    Signed by Dr Thomas Drake on 7/29/2019 11:52 AM            ED BEDSIDE ULTRASOUND:   Performed by ED Physician - none    LABS:  Labs Reviewed   CBC WITH AUTO DIFFERENTIAL - Abnormal; Notable for the following components:       Result Value    MCHC 30.4 (*)     RDW 16.2 (*)     Neutrophils % 89.1 (*)     Lymphocytes % 4.4 (*)     Neutrophils # 9.6 (*)     Lymphocytes # 0.5 (*)     All other components within normal limits   COMPREHENSIVE METABOLIC PANEL W/ REFLEX TO MG FOR LOW K - Abnormal; Notable for the following components:    Sodium 129 (*)     Potassium reflex Magnesium 5.4 (*)     Chloride 88 (*)     Glucose 147 (*)     BUN 61 (*)     CREATININE 3.4 (*)     GFR Non- 13 (*)     Total Protein 6.2 (*)     Alkaline Phosphatase 178 (*)      (*)     AST 41 (*)     All other components within normal limits   TSH WITHOUT REFLEX - Abnormal; Notable for the following components:    TSH 13.900 (*)     All other components within normal limits   BLOOD GAS, ARTERIAL - Abnormal; Notable for the following components:    pH, Arterial 7.310 (*)     pCO2, Arterial 51.0 (*)     pO2, Arterial 64.0 (*)     All other components within normal limits   URINE RT REFLEX TO CULTURE - Abnormal; Notable for the following components:    Clarity, UA CLOUDY (*)     All other components within normal limits   CULTURE BLOOD #1   CULTURE BLOOD #2   LACTIC ACID, PLASMA   AMMONIA   POTASSIUM, WHOLE BLOOD       All other labs were within normal range or not returned as of this dictation. EMERGENCY DEPARTMENT COURSE and DIFFERENTIALDIAGNOSIS/MDM:   Vitals:    Vitals:    07/29/19 1057 07/29/19 1300 07/29/19 1400 07/29/19 1500   BP: (!) 125/56 124/60 122/62 126/64   Pulse: 58 63 66 62   Resp: 20 20 20 18   Temp: 98.2 °F (36.8 °C)  98.5 °F (36.9 °C)    SpO2: 91% 94% 93% 94%   Weight: 140 lb (63.5 kg)      Height: 5' 2\" (1.575 m)          MDM      CONSULTS:  IP CONSULT TO HOSPITALIST  I discussed the case with Dr. Silva Gain:  Unless otherwise notedbelow, none     Procedures    FINAL IMPRESSION     1. Altered mental status, unspecified altered mental status type    2. LETY (acute kidney injury) ([facility] [address]    3. Hyponatremia    4. Abnormal TSH    5.  Pulmonary infiltrate on chest x-ray          DISPOSITION/PLAN   DISPOSITION Decision To Admit 07/29/2019 01:48:29 PM      PATIENT REFERRED TO:  @FUP@    DISCHARGE MEDICATIONS:  New Prescriptions    No medications on file          (Please note that portions of this note were completed with a voice recognition program.  Efforts were made to edit the dictations butoccasionally words are mis-transcribed.)    Krissy Graf MD (electronically signed)  AttendingEmergency Physician         Krissy Graf MD  07/29/19 9240

## 2019-07-30 ENCOUNTER — APPOINTMENT (OUTPATIENT)
Dept: ULTRASOUND IMAGING | Age: 84
DRG: 091 | End: 2019-07-30
Payer: MEDICARE

## 2019-07-30 LAB
ANION GAP SERPL CALCULATED.3IONS-SCNC: 15 MMOL/L (ref 7–19)
BUN BLDV-MCNC: 55 MG/DL (ref 8–23)
CALCIUM SERPL-MCNC: 8.1 MG/DL (ref 8.8–10.2)
CHLORIDE BLD-SCNC: 93 MMOL/L (ref 98–111)
CO2: 24 MMOL/L (ref 22–29)
CREAT SERPL-MCNC: 2.7 MG/DL (ref 0.5–0.9)
FERRITIN: 48.8 NG/ML (ref 13–150)
GFR NON-AFRICAN AMERICAN: 17
GLUCOSE BLD-MCNC: 113 MG/DL (ref 70–99)
GLUCOSE BLD-MCNC: 168 MG/DL (ref 70–99)
GLUCOSE BLD-MCNC: 179 MG/DL (ref 70–99)
GLUCOSE BLD-MCNC: 71 MG/DL (ref 74–109)
GLUCOSE BLD-MCNC: 91 MG/DL (ref 70–99)
HBA1C MFR BLD: 8.4 % (ref 4–6)
HCT VFR BLD CALC: 37.8 % (ref 37–47)
HEMOGLOBIN: 11.2 G/DL (ref 12–16)
IRON SATURATION: 49 % (ref 14–50)
IRON: 163 UG/DL (ref 37–145)
MCH RBC QN AUTO: 27.1 PG (ref 27–31)
MCHC RBC AUTO-ENTMCNC: 29.6 G/DL (ref 33–37)
MCV RBC AUTO: 91.3 FL (ref 81–99)
PARATHYROID HORMONE INTACT: 271.6 PG/ML (ref 15–65)
PDW BLD-RTO: 16 % (ref 11.5–14.5)
PERFORMED ON: ABNORMAL
PERFORMED ON: NORMAL
PLATELET # BLD: 289 K/UL (ref 130–400)
PMV BLD AUTO: 11 FL (ref 9.4–12.3)
POTASSIUM REFLEX MAGNESIUM: 4.5 MMOL/L (ref 3.5–5)
RBC # BLD: 4.14 M/UL (ref 4.2–5.4)
SODIUM BLD-SCNC: 132 MMOL/L (ref 136–145)
SODIUM URINE: 22 MMOL/L
TOTAL IRON BINDING CAPACITY: 330 UG/DL (ref 250–400)
VITAMIN D 25-HYDROXY: 12.6 NG/ML
WBC # BLD: 10.8 K/UL (ref 4.8–10.8)

## 2019-07-30 PROCEDURE — 6360000002 HC RX W HCPCS: Performed by: HOSPITALIST

## 2019-07-30 PROCEDURE — 6370000000 HC RX 637 (ALT 250 FOR IP): Performed by: HOSPITALIST

## 2019-07-30 PROCEDURE — 84300 ASSAY OF URINE SODIUM: CPT

## 2019-07-30 PROCEDURE — 99232 SBSQ HOSP IP/OBS MODERATE 35: CPT | Performed by: HOSPITALIST

## 2019-07-30 PROCEDURE — 2700000000 HC OXYGEN THERAPY PER DAY

## 2019-07-30 PROCEDURE — 83550 IRON BINDING TEST: CPT

## 2019-07-30 PROCEDURE — 85027 COMPLETE CBC AUTOMATED: CPT

## 2019-07-30 PROCEDURE — 83970 ASSAY OF PARATHORMONE: CPT

## 2019-07-30 PROCEDURE — 2580000003 HC RX 258: Performed by: HOSPITALIST

## 2019-07-30 PROCEDURE — 76770 US EXAM ABDO BACK WALL COMP: CPT

## 2019-07-30 PROCEDURE — 84160 ASSAY OF PROTEIN ANY SOURCE: CPT

## 2019-07-30 PROCEDURE — 92523 SPEECH SOUND LANG COMPREHEN: CPT

## 2019-07-30 PROCEDURE — 94640 AIRWAY INHALATION TREATMENT: CPT

## 2019-07-30 PROCEDURE — 2140000000 HC CCU INTERMEDIATE R&B

## 2019-07-30 PROCEDURE — 80048 BASIC METABOLIC PNL TOTAL CA: CPT

## 2019-07-30 PROCEDURE — 84165 PROTEIN E-PHORESIS SERUM: CPT

## 2019-07-30 PROCEDURE — 82948 REAGENT STRIP/BLOOD GLUCOSE: CPT

## 2019-07-30 PROCEDURE — 82306 VITAMIN D 25 HYDROXY: CPT

## 2019-07-30 PROCEDURE — 2500000003 HC RX 250 WO HCPCS: Performed by: HOSPITALIST

## 2019-07-30 PROCEDURE — 82728 ASSAY OF FERRITIN: CPT

## 2019-07-30 PROCEDURE — 83540 ASSAY OF IRON: CPT

## 2019-07-30 PROCEDURE — 92610 EVALUATE SWALLOWING FUNCTION: CPT

## 2019-07-30 PROCEDURE — 36415 COLL VENOUS BLD VENIPUNCTURE: CPT

## 2019-07-30 PROCEDURE — 83036 HEMOGLOBIN GLYCOSYLATED A1C: CPT

## 2019-07-30 RX ORDER — NYSTATIN 100000 U/G
OINTMENT TOPICAL 2 TIMES DAILY
Status: DISCONTINUED | OUTPATIENT
Start: 2019-07-30 | End: 2019-07-31 | Stop reason: HOSPADM

## 2019-07-30 RX ORDER — LEVOTHYROXINE SODIUM ANHYDROUS 100 UG/5ML
50 INJECTION, POWDER, LYOPHILIZED, FOR SOLUTION INTRAVENOUS DAILY
Status: DISCONTINUED | OUTPATIENT
Start: 2019-07-31 | End: 2019-07-31 | Stop reason: HOSPADM

## 2019-07-30 RX ADMIN — CEFTRIAXONE 1 G: 1 INJECTION, POWDER, FOR SOLUTION INTRAMUSCULAR; INTRAVENOUS at 16:38

## 2019-07-30 RX ADMIN — BUDESONIDE 500 MCG: 0.5 INHALANT RESPIRATORY (INHALATION) at 19:03

## 2019-07-30 RX ADMIN — AMLODIPINE BESYLATE 10 MG: 5 TABLET ORAL at 08:59

## 2019-07-30 RX ADMIN — CLOPIDOGREL BISULFATE 75 MG: 75 TABLET ORAL at 08:59

## 2019-07-30 RX ADMIN — IPRATROPIUM BROMIDE AND ALBUTEROL SULFATE 1 AMPULE: 2.5; .5 SOLUTION RESPIRATORY (INHALATION) at 11:20

## 2019-07-30 RX ADMIN — LEVOTHYROXINE SODIUM ANHYDROUS 150 MCG: 100 INJECTION, POWDER, LYOPHILIZED, FOR SOLUTION INTRAVENOUS at 05:07

## 2019-07-30 RX ADMIN — ALLOPURINOL 300 MG: 100 TABLET ORAL at 08:58

## 2019-07-30 RX ADMIN — ENOXAPARIN SODIUM 30 MG: 30 INJECTION SUBCUTANEOUS at 21:00

## 2019-07-30 RX ADMIN — AZITHROMYCIN DIHYDRATE 500 MG: 500 INJECTION, POWDER, LYOPHILIZED, FOR SOLUTION INTRAVENOUS at 16:38

## 2019-07-30 RX ADMIN — ASPIRIN 81 MG 81 MG: 81 TABLET ORAL at 08:59

## 2019-07-30 RX ADMIN — IPRATROPIUM BROMIDE AND ALBUTEROL SULFATE 1 AMPULE: 2.5; .5 SOLUTION RESPIRATORY (INHALATION) at 07:25

## 2019-07-30 RX ADMIN — PANTOPRAZOLE SODIUM 40 MG: 40 TABLET, DELAYED RELEASE ORAL at 08:59

## 2019-07-30 RX ADMIN — BUDESONIDE 500 MCG: 0.5 INHALANT RESPIRATORY (INHALATION) at 07:25

## 2019-07-30 RX ADMIN — Medication 10 ML: at 08:58

## 2019-07-30 RX ADMIN — IPRATROPIUM BROMIDE AND ALBUTEROL SULFATE 1 AMPULE: 2.5; .5 SOLUTION RESPIRATORY (INHALATION) at 15:14

## 2019-07-30 RX ADMIN — IPRATROPIUM BROMIDE AND ALBUTEROL SULFATE 1 AMPULE: 2.5; .5 SOLUTION RESPIRATORY (INHALATION) at 19:03

## 2019-07-30 RX ADMIN — INSULIN LISPRO 1 UNITS: 100 INJECTION, SOLUTION INTRAVENOUS; SUBCUTANEOUS at 16:39

## 2019-07-30 RX ADMIN — NYSTATIN: 100000 OINTMENT TOPICAL at 20:59

## 2019-07-30 RX ADMIN — FERROUS SULFATE TAB 325 MG (65 MG ELEMENTAL FE) 325 MG: 325 (65 FE) TAB at 08:59

## 2019-07-30 ASSESSMENT — PAIN SCALES - GENERAL
PAINLEVEL_OUTOF10: 0

## 2019-07-30 NOTE — PLAN OF CARE
Problem: Falls - Risk of:  Goal: Will remain free from falls  Description  Will remain free from falls  7/30/2019 1031 by Monika Sahu RN  Outcome: Ongoing  7/29/2019 2353 by Nancy Babcock RN  Outcome: Ongoing  Goal: Absence of physical injury  Description  Absence of physical injury  7/30/2019 1031 by Monika Sahu RN  Outcome: Ongoing  7/29/2019 2353 by Nancy Babcock RN  Outcome: Ongoing     Problem: Risk for Impaired Skin Integrity  Goal: Tissue integrity - skin and mucous membranes  Description  Structural intactness and normal physiological function of skin and  mucous membranes.   Outcome: Ongoing

## 2019-07-30 NOTE — PROGRESS NOTES
Speech Language Pathology  Facility/Department: [facility] PROGRESSIVE CARE  Initial Speech/Language/Cognitive Assessment  Bedside Swallow Evaluation     NAME: Teresia Alpers  : 1935   MRN: [id number]  ADMISSION DATE: 2019  ADMITTING DIAGNOSIS: has Carotid artery stenosis; CAD (coronary artery disease); Acid reflux; Hyperlipidemia; Hypertension; Hypothyroidism; Restless legs syndrome; Bilateral carotid artery disease ([facility] [address] Bilateral carotid artery stenosis; Type 2 diabetes mellitus with diabetic polyneuropathy, without long-term current use of insulin ([facility] [address] Pain in both lower extremities; Congestive heart failure ([facility] [address] CKD stage 4 due to type 2 diabetes mellitus ([facility] [address] Lower extremity edema; Weakness of both lower extremities; and Mental confusion on their problem list.    Date of Eval: 2019   Evaluating Therapist: NANDINI Scott    Assessment:  Completed assessment. Patient exhibited clear speech production with functional intelligibility for unfamiliar listeners measured at 100% in conversation. Patient exhibited slowed processing, decreased select and sustained attention, delayed and decreased auditory comprehension as length and complexity of information increases, delayed responsive speech and utterances in conversation inconsistently not pertaining to topics and questions at hand, impaired immediate and short-term/working memory, and decreased reasoning/problem solving. Also assessed patient's swallowing function. Patient exhibits decreased oral prep of more solid consistencies as well as mild-moderately decreased, inconsistently sluggish laryngeal elevation for swallow airway protection. Even so, no outward S/S penetration/aspiration was noted with any ice chip trial, puree consistency trial, regular solid consistency trial, or thin H2O trial administered during evaluation this date. At this time, recommend dysphagia III consistency and thin liquids.  Recommend meds in pudding/applesauce. Assist during meals. Recommendations:  Requires SLP Intervention: Yes    Goals:  Short-term Goals  Timeframe for Short-term Goals: 1-2x/day for 3 days   Goal 1: Patient will tolerate dysphagia III consistency and thin liquids with min S/S penetration/aspiration during PO intake. Goal 2: Patient staff will follow swallow safety recommendations to decrease risk of penetration/aspiration during PO intake. Goal 3: Re-assessment of swallow function for potential diet upgrade. Goal 4: Patient will complete attention and processing tasks with 80% accuracy and with provision of mod cues/prompts. Goal 5: Patient will complete memory functioning tasks with 70% accuracy and with provision of mod cues/prompts. Goal 6: Patient will complete reasoning/problem solving tasks with provision of mod cues/prompts. Subjective:  General  Chart Reviewed: Yes  Patient assessed for rehabilitation services?: Yes  Family / Caregiver Present: Yes    Objective: Auditory Comprehension  Comprehension:  (While delays were noted, patient demonstrated ability to answer simple yes/no questions regarding immediate environment and current state of being at independent level. While delays were observed, patient demonstrated ability to follow simple 1 step commands independently. Delays were noted and mild break down was observed during yes/no questions of increased complexity and during complex 1 step commands. Patient benefited from repetitions of questions and commands.)    Expression  Primary Mode of Expression:  (Confrontation naming of items in room was considered to be [facility].  Structured responsive speech was considered to be mildly delayed but functional. Responses in natural conversation were considered to be delayed and inconsistently did not pertain to topics and questions at hand.)    Motor Speech:  (SLP ranked functional intelligibility of speech for unfamiliar listeners at 100% in

## 2019-07-30 NOTE — CONSULTS
0207          LABA1C       8.3*         8.4*          Hepatic:                 07/29/19                       1146          ALKPHOS      178*          ALT          159*          AST          41*           PROT         6.2*          BILITOT      0.7           LABALBU      4.0           Lactic Acid:                 07/29/19                       1146          LACTA        0.8           Troponin: No results for input(s): CKTOTAL, CKMB, TROPONINT in the last 72 hours. ABGs: No results for input(s): PH, PCO2, PO2, HCO3, O2SAT in the last 72 hours. CRP:  No results for input(s): CRP in the last 72 hours. Sed Rate:  No results for input(s): SEDRATE in the last 72 hours. Cultures:   No results for input(s): CULTURE in the last 72 hours. No results for input(s): BC, [place] in the last 72 hours. No results for input(s): CXSURG in the last 72 hours. Radiology reports as per the Radiologist  Radiology: Ct Head Wo Contrast    Result Date: 7/29/2019  Examination. CT HEAD WO CONTRAST History: Altered mental status. DLP: 720 mGycm. The CT scan of the head is performed without intravenous contrast enhancement. The images are acquired in axial plane with subsequent reconstruction in coronal and sagittal planes. There is no previous study for comparison. There is no evidence of a mass or midline shift. There is no evidence of intracranial hemorrhage or hematoma. Moderately dilated ventricles, basal cisterns cortical sulci represent chronic volume loss/atrophy. This chronic white matter ischemic changes. The images reviewed in bone window show no acute bony abnormality. There is mucosal thickening involving the right sphenoid sinus. The remaining paranasal sinuses and mastoid air cells are normal.    No acute intracranial abnormality. Chronic ischemic and atrophic changes. Right sphenoid sinusitis. The above findings are recorded on a digital voice clip in PACS.  Signed by Dr Elton Alston on 7/29/2019 3:01 PM    Us

## 2019-07-30 NOTE — PROGRESS NOTES
4 Eyes Skin Assessment    Omega Foster is being assessed upon: Admission    I agree that Feliciana Councilman, along with Rimma Morales, RN (either 2 RN's or 1 LPN and 1 RN) have performed a thorough Head to Toe Skin Assessment on the patient. ALL assessment sites listed below have been assessed. Areas assessed by both nurses:     [x]   Head, Face, and Ears   [x]   Shoulders, Back, and Chest  [x]   Arms, Elbows, and Hands   [x]   Coccyx, Sacrum, and Ischium  [x]   Legs, Feet, and Heels    Does the Patient have Skin Breakdown?  No    Sandor Prevention initiated: No  Wound Care Orders initiated: No    WOC nurse consulted for Pressure Injury (Stage 3,4, Unstageable, DTI, NWPT, and Complex wounds) and New or Established Ostomies: No        Primary Nurse eSignature: Barbara Gagnon RN on 7/29/2019 at 8:40 PM      Co-Signer eSignature: Electronically signed by Yanet Gaxiola RN on 7/30/19 at 12:35 AM

## 2019-07-30 NOTE — PROGRESS NOTES
Jose A Leos arrived to room # 723. Presented with: altered mental status  Mental Status: Patient is oriented, alert and coherent. Vitals:    07/29/19 1904   BP:    Pulse: 72   Resp:    Temp:    SpO2:      Patient safety contract and falls prevention contract reviewed with patient Yes. Oriented Patient to room. Call light within reach. Yes.   Needs, issues or concerns expressed at this time: no.      Electronically signed by Adria Khan RN on 7/29/2019 at 8:39 PM

## 2019-07-31 VITALS
TEMPERATURE: 96.3 F | WEIGHT: 148.2 LBS | HEIGHT: 62 IN | OXYGEN SATURATION: 95 % | RESPIRATION RATE: 18 BRPM | SYSTOLIC BLOOD PRESSURE: 120 MMHG | HEART RATE: 110 BPM | DIASTOLIC BLOOD PRESSURE: 60 MMHG | BODY MASS INDEX: 27.27 KG/M2

## 2019-07-31 PROBLEM — Z51.5 PALLIATIVE CARE PATIENT: Status: ACTIVE | Noted: 2019-07-31

## 2019-07-31 LAB
ALBUMIN SERPL-MCNC: 3.1 G/DL (ref 3.5–5.2)
ALP BLD-CCNC: 137 U/L (ref 35–104)
ALT SERPL-CCNC: 78 U/L (ref 5–33)
ANION GAP SERPL CALCULATED.3IONS-SCNC: 15 MMOL/L (ref 7–19)
AST SERPL-CCNC: 20 U/L (ref 5–32)
BILIRUB SERPL-MCNC: 0.3 MG/DL (ref 0.2–1.2)
BUN BLDV-MCNC: 39 MG/DL (ref 8–23)
CALCIUM SERPL-MCNC: 8.7 MG/DL (ref 8.8–10.2)
CHLORIDE BLD-SCNC: 100 MMOL/L (ref 98–111)
CO2: 25 MMOL/L (ref 22–29)
CREAT SERPL-MCNC: 1.5 MG/DL (ref 0.5–0.9)
GFR NON-AFRICAN AMERICAN: 33
GLUCOSE BLD-MCNC: 104 MG/DL (ref 74–109)
GLUCOSE BLD-MCNC: 151 MG/DL (ref 70–99)
GLUCOSE BLD-MCNC: 96 MG/DL (ref 70–99)
HCT VFR BLD CALC: 39.6 % (ref 37–47)
HEMOGLOBIN: 11.9 G/DL (ref 12–16)
MCH RBC QN AUTO: 27.7 PG (ref 27–31)
MCHC RBC AUTO-ENTMCNC: 30.1 G/DL (ref 33–37)
MCV RBC AUTO: 92.1 FL (ref 81–99)
PDW BLD-RTO: 16.1 % (ref 11.5–14.5)
PERFORMED ON: ABNORMAL
PERFORMED ON: NORMAL
PLATELET # BLD: 311 K/UL (ref 130–400)
PMV BLD AUTO: 10.9 FL (ref 9.4–12.3)
POTASSIUM SERPL-SCNC: 4.7 MMOL/L (ref 3.5–5)
RBC # BLD: 4.3 M/UL (ref 4.2–5.4)
SODIUM BLD-SCNC: 140 MMOL/L (ref 136–145)
TOTAL PROTEIN: 5.7 G/DL (ref 6.6–8.7)
WBC # BLD: 10.9 K/UL (ref 4.8–10.8)

## 2019-07-31 PROCEDURE — 99239 HOSP IP/OBS DSCHRG MGMT >30: CPT | Performed by: HOSPITALIST

## 2019-07-31 PROCEDURE — 92526 ORAL FUNCTION THERAPY: CPT

## 2019-07-31 PROCEDURE — 80053 COMPREHEN METABOLIC PANEL: CPT

## 2019-07-31 PROCEDURE — 82948 REAGENT STRIP/BLOOD GLUCOSE: CPT

## 2019-07-31 PROCEDURE — 36415 COLL VENOUS BLD VENIPUNCTURE: CPT

## 2019-07-31 PROCEDURE — 94640 AIRWAY INHALATION TREATMENT: CPT

## 2019-07-31 PROCEDURE — 2500000003 HC RX 250 WO HCPCS: Performed by: HOSPITALIST

## 2019-07-31 PROCEDURE — 6370000000 HC RX 637 (ALT 250 FOR IP): Performed by: HOSPITALIST

## 2019-07-31 PROCEDURE — 85027 COMPLETE CBC AUTOMATED: CPT

## 2019-07-31 PROCEDURE — 6360000002 HC RX W HCPCS: Performed by: HOSPITALIST

## 2019-07-31 PROCEDURE — 2700000000 HC OXYGEN THERAPY PER DAY

## 2019-07-31 PROCEDURE — 2580000003 HC RX 258: Performed by: HOSPITALIST

## 2019-07-31 RX ORDER — CEFUROXIME AXETIL 500 MG/1
500 TABLET ORAL 2 TIMES DAILY
Qty: 14 TABLET | Refills: 0 | Status: SHIPPED | OUTPATIENT
Start: 2019-07-31 | End: 2019-08-07

## 2019-07-31 RX ORDER — METHYLPREDNISOLONE 4 MG/1
TABLET ORAL
Qty: 1 KIT | Refills: 0 | Status: SHIPPED | OUTPATIENT
Start: 2019-07-31 | End: 2019-08-06

## 2019-07-31 RX ORDER — LEVOTHYROXINE SODIUM 0.15 MG/1
150 TABLET ORAL DAILY
Qty: 90 TABLET | Refills: 1 | Status: SHIPPED | OUTPATIENT
Start: 2019-07-31

## 2019-07-31 RX ORDER — FUROSEMIDE 20 MG/1
40 TABLET ORAL 2 TIMES DAILY
Qty: 60 TABLET | Refills: 3 | Status: SHIPPED | OUTPATIENT
Start: 2019-07-31

## 2019-07-31 RX ORDER — ERGOCALCIFEROL 1.25 MG/1
50000 CAPSULE ORAL WEEKLY
Qty: 4 CAPSULE | Refills: 0 | Status: SHIPPED | OUTPATIENT
Start: 2019-07-31

## 2019-07-31 RX ORDER — NYSTATIN 100000 U/G
OINTMENT TOPICAL
Qty: 1 TUBE | Refills: 0 | Status: SHIPPED | OUTPATIENT
Start: 2019-07-31

## 2019-07-31 RX ADMIN — CLOPIDOGREL BISULFATE 75 MG: 75 TABLET ORAL at 08:39

## 2019-07-31 RX ADMIN — FERROUS SULFATE TAB 325 MG (65 MG ELEMENTAL FE) 325 MG: 325 (65 FE) TAB at 08:39

## 2019-07-31 RX ADMIN — IPRATROPIUM BROMIDE AND ALBUTEROL SULFATE 1 AMPULE: 2.5; .5 SOLUTION RESPIRATORY (INHALATION) at 07:03

## 2019-07-31 RX ADMIN — AMLODIPINE BESYLATE 10 MG: 5 TABLET ORAL at 08:39

## 2019-07-31 RX ADMIN — PANTOPRAZOLE SODIUM 40 MG: 40 TABLET, DELAYED RELEASE ORAL at 08:40

## 2019-07-31 RX ADMIN — IPRATROPIUM BROMIDE AND ALBUTEROL SULFATE 1 AMPULE: 2.5; .5 SOLUTION RESPIRATORY (INHALATION) at 14:55

## 2019-07-31 RX ADMIN — IPRATROPIUM BROMIDE AND ALBUTEROL SULFATE 1 AMPULE: 2.5; .5 SOLUTION RESPIRATORY (INHALATION) at 11:09

## 2019-07-31 RX ADMIN — LEVOTHYROXINE SODIUM ANHYDROUS 50 MCG: 100 INJECTION, POWDER, LYOPHILIZED, FOR SOLUTION INTRAVENOUS at 05:50

## 2019-07-31 RX ADMIN — Medication 10 ML: at 08:39

## 2019-07-31 RX ADMIN — ASPIRIN 81 MG 81 MG: 81 TABLET ORAL at 08:39

## 2019-07-31 RX ADMIN — NYSTATIN: 100000 OINTMENT TOPICAL at 08:39

## 2019-07-31 RX ADMIN — BUDESONIDE 500 MCG: 0.5 INHALANT RESPIRATORY (INHALATION) at 07:03

## 2019-07-31 RX ADMIN — INSULIN LISPRO 1 UNITS: 100 INJECTION, SOLUTION INTRAVENOUS; SUBCUTANEOUS at 12:05

## 2019-07-31 RX ADMIN — ALLOPURINOL 300 MG: 100 TABLET ORAL at 08:39

## 2019-07-31 ASSESSMENT — PAIN SCALES - GENERAL
PAINLEVEL_OUTOF10: 0

## 2019-07-31 NOTE — CARE COORDINATION
Informed by P/C RN Tyler Schafer who reports has spoken with dtr and pt and they are requesting home hospice services and a code status change to DNR because the pt does not wish to be a full code. SW provided [place] with pt's [place] contact to request the orders.

## 2019-07-31 NOTE — PROGRESS NOTES
Physical Therapy    Please discontinue STRICT BEDREST to begin mobility assessment.     Electronically signed by Marc Dougherty PT on 7/31/2019 at 10:44 AM

## 2019-07-31 NOTE — PROGRESS NOTES
Nephrology ([address]) Progress  Note      Patient:  Akilah Lewis  YOB: 1935  Date of Service: 7/31/2019  MRN: [id number]   Acct: [de-identified]   Primary Care Physician: Jaimie Pizarro MD  Advance Directive: Full Code  Admit Date: 7/29/2019       Hospital Day: 2  Referring Provider: Rufus Christian MD    Patient independently seen and examined, Chart, Consults, Notes, Operative notes, Labs, Cardiology, and Radiology studies reviewed as able. Chief complaint: Abnormal labs. Subjective:  Akilah Lewis is a 82 y.o. female  whom we were consulted for acute kidney injury/chronic kidney disease. Presented with confusion, disorientation. She was found to have low blood pressure and bradycardia. She was treated with IV fluid and discontinuation of beta-blocker. She has responded very well to IV fluid and renal function has slowly improved.     This morning she is sitting up in the chair and eating breakfast    Allergies:  Statins and Valium [diazepam]    Medicines:  Current Facility-Administered Medications   Medication Dose Route Frequency Provider Last Rate Last Dose    insulin lispro (HUMALOG) injection vial 0-6 Units  0-6 Units Subcutaneous TID WC Rufus Christian MD   1 Units at 07/31/19 1205    insulin lispro (HUMALOG) injection vial 0-3 Units  0-3 Units Subcutaneous Nightly Rufus Christian MD        levothyroxine (SYNTHROID) injection 50 mcg  50 mcg Intravenous Daily Rufus Christian MD   50 mcg at 07/31/19 0550    nystatin (MYCOSTATIN) ointment   Topical BID Rufus Christian MD        allopurinol (ZYLOPRIM) tablet 300 mg  300 mg Oral Daily Rufus Christian MD   300 mg at 07/31/19 0839    amLODIPine (NORVASC) tablet 10 mg  10 mg Oral Daily Rufus Christian MD   10 mg at 07/31/19 8628    aspirin chewable tablet 81 mg  81 mg Oral Daily Rufus Christian MD   81 mg at 07/31/19 0839    clopidogrel (PLAVIX) tablet 75 mg  75 mg Oral Daily Rufus Christian MD   75 mg at 07/31/19 0839    sexual activity: Not on file   Other Topics Concern    Not on file   Social History Narrative    Not on file         Review of Systems:  History obtained from chart review and the patient  General ROS: No fever or chills  Respiratory ROS: No cough, shortness of breath, wheezing  Cardiovascular ROS: No chest pain or palpitations  Gastrointestinal ROS: No abdominal pain or melena  Genito-Urinary ROS: No dysuria or hematuria  Musculoskeletal ROS: No joint pain or swelling   14 point ROS reviewed with the patient and negative except as noted above and in the HPI unless unable to obtain. Objective:  Patient Vitals for the past 24 hrs:   BP Temp Temp src Pulse Resp SpO2 Weight   07/31/19 1035 119/62 96.6 °F (35.9 °C) Temporal 80 20 91 % --   07/31/19 0612 129/70 97.2 °F (36.2 °C) Temporal 90 20 92 % --   07/31/19 0458 -- -- -- -- -- -- 148 lb 3.2 oz (67.2 kg)   07/31/19 0003 (!) 107/59 97.3 °F (36.3 °C) Temporal 72 16 93 % --   07/30/19 1903 -- -- -- -- -- 93 % --   07/30/19 1820 (!) 108/55 96.8 °F (36 °C) Temporal 71 16 94 % --   07/30/19 1440 119/64 98.1 °F (36.7 °C) Temporal 69 18 91 % --       Intake/Output Summary (Last 24 hours) at 7/31/2019 1323  Last data filed at 7/31/2019 1035  Gross per 24 hour   Intake 530 ml   Output 1700 ml   Net -1170 ml     General: awake/alert   HEENT: Normocephalic atraumatic head  Neck: Supple with no JVD or carotid bruits.   Chest:  clear to auscultation bilaterally without respiratory distress  CVS: regular rate and rhythm  Abdominal: soft, nontender, normal bowel sounds  Extremities: no cyanosis or edema  Skin: warm and dry without rash      Labs:  BMP:   Recent Labs     07/29/19  1146 07/30/19  0207 07/31/19  0216   * 132* 140   K 5.4* 4.5 4.7   CL 88* 93* 100   CO2 25 24 25   BUN 61* 55* 39*   CREATININE 3.4* 2.7* 1.5*   CALCIUM 8.8 8.1* 8.7*     CBC:   Recent Labs     07/29/19  1146 07/30/19  0207 07/31/19  0216   WBC 10.7 10.8 10.9*   HGB 12.5 11.2* 11.9*   HCT 41.1 37.8 39.6   MCV 90.9 91.3 92.1    289 311     LIVER PROFILE:   Recent Labs     07/29/19  1146 07/31/19  0216   AST 41* 20   * 78*   BILITOT 0.7 0.3   ALKPHOS 178* 137*     PT/INR: No results for input(s): PROTIME, INR in the last 72 hours. APTT: No results for input(s): APTT in the last 72 hours. BNP:  No results for input(s): BNP in the last 72 hours. Ionized Calcium:No results for input(s): IONCA in the last 72 hours. Magnesium:No results for input(s): MG in the last 72 hours. Phosphorus:No results for input(s): PHOS in the last 72 hours. HgbA1C:   Recent Labs     07/29/19  2016 07/30/19  0207   LABA1C 8.3* 8.4*     Hepatic:   Recent Labs     07/29/19  1146 07/31/19  0216   ALKPHOS 178* 137*   * 78*   AST 41* 20   PROT 6.2* 5.7*   BILITOT 0.7 0.3   LABALBU 4.0 3.1*     Lactic Acid:   Recent Labs     07/29/19  1146   LACTA 0.8     Troponin: No results for input(s): CKTOTAL, CKMB, TROPONINT in the last 72 hours. ABGs: No results for input(s): PH, PCO2, PO2, HCO3, O2SAT in the last 72 hours. CRP:  No results for input(s): CRP in the last 72 hours. Sed Rate:  No results for input(s): SEDRATE in the last 72 hours. Cultures:   No results for input(s): CULTURE in the last 72 hours. Recent Labs     07/29/19  1800 07/29/19  1815   BC No Growth to date. Any change in status will be called. --    BLOODCULT2  --  No Growth to date. Any change in status will be called. No results for input(s): CXSURG in the last 72 hours. Radiology reports as per the Radiologist  Radiology: Ct Head Wo Contrast    Result Date: 7/29/2019  Examination. CT HEAD WO CONTRAST History: Altered mental status. DLP: 720 mGycm. The CT scan of the head is performed without intravenous contrast enhancement. The images are acquired in axial plane with subsequent reconstruction in coronal and sagittal planes. There is no previous study for comparison. There is no evidence of a mass or midline shift.  There is no evidence of intracranial hemorrhage or hematoma. Moderately dilated ventricles, basal cisterns cortical sulci represent chronic volume loss/atrophy. This chronic white matter ischemic changes. The images reviewed in bone window show no acute bony abnormality. There is mucosal thickening involving the right sphenoid sinus. The remaining paranasal sinuses and mastoid air cells are normal.    No acute intracranial abnormality. Chronic ischemic and atrophic changes. Right sphenoid sinusitis. The above findings are recorded on a digital voice clip in PACS. Signed by Dr Ant Triana on 7/29/2019 3:01 PM    Us Renal Complete    Result Date: 7/30/2019  EXAM: US RENAL COMPLETE -- 7/30/2019 5:15 AM HISTORY: 82 years, Female, renal failure, acute kidney injury COMPARISON: None available TECHNIQUE: Real-time ultrasound performed with representative images saved to PACS. FINDINGS: RIGHT KIDNEY. Measures 10.5 x 4.5 x 5.3 cm. Diffuse thinning of the renal cortex. Mildly increased renal cortical echogenicity. No hydronephrosis demonstrated. LEFT KIDNEY. Measures 12.0 x 5.8 x 4.0 cm. Diffuse thinning of the renal cortex. Mildly increased renal cortical echogenicity. No hydronephrosis demonstrated. URINARY BLADDER. Fluid-filled with uniform wall. 1. Cortical thinning and increased renal echogenicity, most commonly due to medical renal disease. 2. No hydronephrosis. Signed by Dr Jazmyn Hurt on 7/30/2019 8:07 AM    Xr Chest Portable    Result Date: 7/29/2019  EXAMINATION:  XR CHEST PORTABLE  7/29/2019 11:51 AM HISTORY: Altered mental status. Low O2 saturation. COMPARISON: 6/4/2019. FINDINGS:  There is poor inspiration with vascular crowding. There is minimal patchy infiltrate in the lung bases. There is a large hiatal hernia. There is cardiomegaly. There has been prior median sternotomy. 1. Poor inspiration with vascular crowding. 2. Patchy infiltrate in the lung bases likely representing atelectasis.  Pneumonia and pulmonary

## 2019-07-31 NOTE — CARE COORDINATION
Home Health Referral received. Per DC planner and palliative care notes, patient and family want hospice not home health. Spoke with floor nurse who verified and said she would contact Hospitalist.  Will sign off.  Electronically signed by Jame Delong RN on 7/31/19 at 2:26 PM

## 2019-07-31 NOTE — DISCHARGE SUMMARY
Hospitalist   Discharge Summary    Toby Cottrell  :  1935  MRN:  [id number]    Admit date:  2019  Discharge date:  2019    Admitting Physician:  Yuan Vera MD    Advance Directive: Full Code    Consults: nephrology     Primary Care Physician:  Alfredo Teague MD    Discharge Diagnoses: Active Problems:    Mental confusion    Palliative care patient  Resolved Problems:    * No resolved hospital problems. *      Significant Diagnostic Studies:   Ct Head Wo Contrast    Result Date: 2019  Examination. CT HEAD WO CONTRAST History: Altered mental status. DLP: 720 mGycm. The CT scan of the head is performed without intravenous contrast enhancement. The images are acquired in axial plane with subsequent reconstruction in coronal and sagittal planes. There is no previous study for comparison. There is no evidence of a mass or midline shift. There is no evidence of intracranial hemorrhage or hematoma. Moderately dilated ventricles, basal cisterns cortical sulci represent chronic volume loss/atrophy. This chronic white matter ischemic changes. The images reviewed in bone window show no acute bony abnormality. There is mucosal thickening involving the right sphenoid sinus. The remaining paranasal sinuses and mastoid air cells are normal.    No acute intracranial abnormality. Chronic ischemic and atrophic changes. Right sphenoid sinusitis. The above findings are recorded on a digital voice clip in PACS. Signed by Dr Juliane Kendall on 2019 3:01 PM    Us Renal Complete    Result Date: 2019  EXAM: US RENAL COMPLETE -- 2019 5:15 AM HISTORY: 82 years, Female, renal failure, acute kidney injury COMPARISON: None available TECHNIQUE: Real-time ultrasound performed with representative images saved to PACS. FINDINGS: RIGHT KIDNEY. Measures 10.5 x 4.5 x 5.3 cm. Diffuse thinning of the renal cortex. Mildly increased renal cortical echogenicity. No hydronephrosis demonstrated. LEFT KIDNEY.  Measures 12.0 organomegaly  Extremities:atraumatic, no cyanosis no edema no calf tenderness   Neurologic:non focal    Skin: no rashes, nodules.       Discharge Medications:       Linda Roberson   Home Medication Instructions LQO:[id number]    Printed on:07/31/19 4346   Medication Information                      albuterol (PROVENTIL) (2.5 MG/3ML) 0.083% nebulizer solution  Take 3 mLs by nebulization 4 times daily DX: J44.1             allopurinol (ZYLOPRIM) 300 MG tablet  Take 300 mg by mouth daily             amLODIPine (NORVASC) 10 MG tablet  Take 1 tablet by mouth daily             aspirin 81 MG chewable tablet  Take 81 mg by mouth daily             budesonide-formoterol (SYMBICORT) 160-4.5 MCG/ACT AERO  Inhale 1 puff into the lungs 2 times daily              cefUROXime (CEFTIN) 500 MG tablet  Take 1 tablet by mouth 2 times daily for 7 days             clopidogrel (PLAVIX) 75 MG tablet  Take 1 tablet by mouth daily             Cyanocobalamin (B-12 IJ)  Inject as directed every 30 days             Elastic Bandages & Supports (KNEE BRACE ADJUSTABLE HINGED) MISC  Use while awake             Elastic Bandages & Supports (MEDICAL COMPRESSION STOCKINGS) MISC  1 each by Does not apply route daily 20-30 mmHg  Sized to fit             ferrous sulfate 325 (65 Fe) MG tablet  Take 325 mg by mouth daily             furosemide (LASIX) 20 MG tablet  Take 2 tablets by mouth 2 times daily             Hospital Bed MISC  by Does not apply route             levothyroxine (SYNTHROID) 137 MCG tablet  Take 1 tablet by mouth Daily             levothyroxine (SYNTHROID) 150 MCG tablet  Take 1 tablet by mouth daily             Lift Chair MISC  by Does not apply route             methylPREDNISolone (MEDROL DOSEPACK) 4 MG tablet  Take by mouth.             metoprolol (TOPROL-XL) 50 MG XL tablet  Take 50 mg by mouth 3 times daily              Nebulizers (AIRIAL COMPACT MINI NEBULIZER) MISC  Qid prn             nortriptyline (PAMELOR) 10 MG capsule  Take 2

## 2019-08-02 LAB
ALBUMIN SERPL-MCNC: 3.48 G/DL (ref 3.75–5.01)
ALPHA-1-GLOBULIN: 0.52 G/DL (ref 0.19–0.46)
ALPHA-2-GLOBULIN: 0.93 G/DL (ref 0.48–1.05)
BETA GLOBULIN: 0.61 G/DL (ref 0.48–1.1)
GAMMA GLOBULIN: 0.46 G/DL (ref 0.62–1.51)
PROTEIN ELECTROPHORESIS, SERUM: ABNORMAL
SPE/IFE INTERPRETATION: ABNORMAL
TOTAL PROTEIN: 6 G/DL (ref 6–8.3)

## 2019-08-03 LAB
BLOOD CULTURE, ROUTINE: NORMAL
CULTURE, BLOOD 2: NORMAL

## 2019-08-07 ENCOUNTER — OFFICE VISIT (OUTPATIENT)
Dept: FAMILY MEDICINE CLINIC | Age: 84
End: 2019-08-07
Payer: MEDICARE

## 2019-08-07 VITALS
SYSTOLIC BLOOD PRESSURE: 120 MMHG | DIASTOLIC BLOOD PRESSURE: 66 MMHG | OXYGEN SATURATION: 90 % | HEART RATE: 60 BPM | BODY MASS INDEX: 26.52 KG/M2 | WEIGHT: 145 LBS | TEMPERATURE: 97.1 F

## 2019-08-07 DIAGNOSIS — N17.9 AKI (ACUTE KIDNEY INJURY) (HCC): ICD-10-CM

## 2019-08-07 DIAGNOSIS — E03.9 HYPOTHYROIDISM, UNSPECIFIED TYPE: ICD-10-CM

## 2019-08-07 DIAGNOSIS — R41.82 ALTERED MENTAL STATUS, UNSPECIFIED ALTERED MENTAL STATUS TYPE: ICD-10-CM

## 2019-08-07 DIAGNOSIS — E55.9 VITAMIN D DEFICIENCY: ICD-10-CM

## 2019-08-07 DIAGNOSIS — J44.1 COPD WITH ACUTE EXACERBATION (HCC): ICD-10-CM

## 2019-08-07 DIAGNOSIS — R41.82 ALTERED MENTAL STATUS, UNSPECIFIED ALTERED MENTAL STATUS TYPE: Primary | ICD-10-CM

## 2019-08-07 LAB
ALBUMIN SERPL-MCNC: 4 G/DL (ref 3.5–5.2)
ALP BLD-CCNC: 118 U/L (ref 35–104)
ALT SERPL-CCNC: 17 U/L (ref 5–33)
ANION GAP SERPL CALCULATED.3IONS-SCNC: 16 MMOL/L (ref 7–19)
AST SERPL-CCNC: 16 U/L (ref 5–32)
BILIRUB SERPL-MCNC: 0.5 MG/DL (ref 0.2–1.2)
BUN BLDV-MCNC: 37 MG/DL (ref 8–23)
CALCIUM SERPL-MCNC: 9.2 MG/DL (ref 8.8–10.2)
CHLORIDE BLD-SCNC: 88 MMOL/L (ref 98–111)
CO2: 33 MMOL/L (ref 22–29)
CREAT SERPL-MCNC: 1.4 MG/DL (ref 0.5–0.9)
GFR NON-AFRICAN AMERICAN: 36
GLUCOSE BLD-MCNC: 97 MG/DL (ref 74–109)
HCT VFR BLD CALC: 46.3 % (ref 37–47)
HEMOGLOBIN: 13.3 G/DL (ref 12–16)
MCH RBC QN AUTO: 26.9 PG (ref 27–31)
MCHC RBC AUTO-ENTMCNC: 28.7 G/DL (ref 33–37)
MCV RBC AUTO: 93.7 FL (ref 81–99)
PDW BLD-RTO: 15.8 % (ref 11.5–14.5)
PLATELET # BLD: 332 K/UL (ref 130–400)
PMV BLD AUTO: 10.7 FL (ref 9.4–12.3)
POTASSIUM SERPL-SCNC: 3.7 MMOL/L (ref 3.5–5)
RBC # BLD: 4.94 M/UL (ref 4.2–5.4)
SODIUM BLD-SCNC: 137 MMOL/L (ref 136–145)
TOTAL PROTEIN: 6.7 G/DL (ref 6.6–8.7)
WBC # BLD: 19.5 K/UL (ref 4.8–10.8)

## 2019-08-07 PROCEDURE — 99214 OFFICE O/P EST MOD 30 MIN: CPT | Performed by: FAMILY MEDICINE

## 2019-08-07 ASSESSMENT — ENCOUNTER SYMPTOMS
ALLERGIC/IMMUNOLOGIC NEGATIVE: 1
RESPIRATORY NEGATIVE: 1
GASTROINTESTINAL NEGATIVE: 1
EYES NEGATIVE: 1

## 2019-08-07 NOTE — PROGRESS NOTES
SUBJECTIVE:    Patient ID: Jose A Leos is a 82 y. o.female. HPI:   Here for hospital follow-up. Patient is a 72-year-old white female. She has history of COPD and chronic kidney disease. She was noncompliant with hydration and using her oxygen. She progressively got more weak and confused and was taken to the emergency room. She was found to have an acute kidney injury and hypoxia. She was treated for a COPD exacerbation and she improved clinically with some gentle hydration. Her thyroid was adjusted while in the hospital.  Also she was found to has low vitamin D and was placed on supplementation therapy. She stated that she has been doing really well since discharge from the hospital.  She been drinking plenty of fluids and been compliant with oxygen therapy. She is about to finish out course of steroids and antibiotics. She feels back to her baseline. Past Medical History:   Diagnosis Date    Acid reflux     Arthritis     Blood circulation, collateral     CAD (coronary artery disease)     Carotid artery stenosis     [facility]) [facility])     COPD (chronic obstructive pulmonary disease) ([facility])     Diabetes ([facility])     type 2    Gout     Hyperlipidemia     Hypertension     Hypothyroidism     Palliative care patient 07/31/2019    Renal insufficiency     Restless legs syndrome       Current Outpatient Medications   Medication Sig Dispense Refill    alirocumab (PRALUENT) 75 MG/ML SOPN injection pen Inject 1 pen into the skin every 14 days      nystatin (MYCOSTATIN) 307073 UNIT/GM ointment Apply topically 2 times daily.  1 Tube 0    levothyroxine (SYNTHROID) 150 MCG tablet Take 1 tablet by mouth daily 90 tablet 1    furosemide (LASIX) 20 MG tablet Take 2 tablets by mouth 2 times daily 60 tablet 3    vitamin D (ERGOCALCIFEROL) 26460 units CAPS capsule Take 1 capsule by mouth once a week 4 capsule 0    cefUROXime (CEFTIN) 500 MG tablet Take 1 tablet by mouth 2 times daily for 7 days 14 tablet 0    clopidogrel (PLAVIX) 75 MG tablet Take 1 tablet by mouth daily 90 tablet 3    Elastic Bandages & Supports (KNEE BRACE ADJUSTABLE HINGED) MISC Use while awake 1 each 0    nortriptyline (PAMELOR) 10 MG capsule Take 2 capsules by mouth nightly 60 capsule 5    SITagliptin (JANUVIA) 50 MG tablet Take 1 tablet by mouth daily 30 tablet [address] by Does not apply route 1 each 0    albuterol (PROVENTIL) (2.5 MG/3ML) 0.083% nebulizer solution Take 3 mLs by nebulization 4 times daily DX: J44.1 120 each 3    valsartan (DIOVAN) 80 MG tablet Take 80 mg by mouth      Nebulizers (AIRIAL COMPACT MINI NEBULIZER) MISC Qid prn 1 each 0    amLODIPine (NORVASC) 10 MG tablet Take 1 tablet by mouth daily 30 tablet 5    Omega-3 Fatty Acids (OMEGA 3 PO) Take by mouth      Cyanocobalamin (B-12 IJ) Inject as directed every 30 days      Lift Chair MISC by Does not apply route 1 each 0    Elastic Bandages & Supports (MEDICAL COMPRESSION STOCKINGS) MISC 1 each by Does not apply route daily 20-30 mmHg  Sized to fit 1 each 5    ferrous sulfate 325 (65 Fe) MG tablet Take 325 mg by mouth daily      allopurinol (ZYLOPRIM) 300 MG tablet Take 300 mg by mouth daily      budesonide-formoterol (SYMBICORT) 160-4.5 MCG/ACT AERO Inhale 1 puff into the lungs 2 times daily       OXYGEN Inhale 2 L into the lungs daily      aspirin 81 MG chewable tablet Take 81 mg by mouth daily      metoprolol (TOPROL-XL) 50 MG XL tablet Take 50 mg by mouth 3 times daily       omeprazole (PRILOSEC OTC) 20 MG tablet Take 20.6 mg by mouth       No current facility-administered medications for this visit. Allergies   Allergen Reactions    Statins      Lose of feeling in legs.  Valium [Diazepam]        Review of Systems   Constitutional: Negative. HENT: Negative. Eyes: Negative. Respiratory: Negative. Cardiovascular: Negative. Gastrointestinal: Negative. Endocrine: Negative.     Genitourinary:

## 2019-08-21 ENCOUNTER — OFFICE VISIT (OUTPATIENT)
Dept: FAMILY MEDICINE CLINIC | Age: 84
End: 2019-08-21
Payer: MEDICARE

## 2019-08-21 VITALS
OXYGEN SATURATION: 85 % | BODY MASS INDEX: 25.79 KG/M2 | SYSTOLIC BLOOD PRESSURE: 122 MMHG | TEMPERATURE: 96 F | DIASTOLIC BLOOD PRESSURE: 70 MMHG | HEART RATE: 90 BPM | WEIGHT: 141 LBS

## 2019-08-21 DIAGNOSIS — B37.81 ESOPHAGEAL CANDIDIASIS (HCC): ICD-10-CM

## 2019-08-21 DIAGNOSIS — K22.11 ULCER OF ESOPHAGUS WITH BLEEDING: Primary | ICD-10-CM

## 2019-08-21 DIAGNOSIS — K22.11 ULCER OF ESOPHAGUS WITH BLEEDING: ICD-10-CM

## 2019-08-21 DIAGNOSIS — J44.1 COPD WITH ACUTE EXACERBATION (HCC): ICD-10-CM

## 2019-08-21 DIAGNOSIS — K92.0 GASTROINTESTINAL HEMORRHAGE WITH HEMATEMESIS: ICD-10-CM

## 2019-08-21 LAB
ALBUMIN SERPL-MCNC: 3.5 G/DL (ref 3.5–5.2)
ALP BLD-CCNC: 125 U/L (ref 35–104)
ALT SERPL-CCNC: 11 U/L (ref 5–33)
ANION GAP SERPL CALCULATED.3IONS-SCNC: 15 MMOL/L (ref 7–19)
AST SERPL-CCNC: 12 U/L (ref 5–32)
BILIRUB SERPL-MCNC: 0.6 MG/DL (ref 0.2–1.2)
BUN BLDV-MCNC: 17 MG/DL (ref 8–23)
CALCIUM SERPL-MCNC: 9 MG/DL (ref 8.8–10.2)
CHLORIDE BLD-SCNC: 93 MMOL/L (ref 98–111)
CO2: 29 MMOL/L (ref 22–29)
CREAT SERPL-MCNC: 1.3 MG/DL (ref 0.5–0.9)
GFR NON-AFRICAN AMERICAN: 39
GLUCOSE BLD-MCNC: 153 MG/DL (ref 74–109)
HCT VFR BLD CALC: 36.7 % (ref 37–47)
HEMOGLOBIN: 11 G/DL (ref 12–16)
MCH RBC QN AUTO: 28.7 PG (ref 27–31)
MCHC RBC AUTO-ENTMCNC: 30 G/DL (ref 33–37)
MCV RBC AUTO: 95.8 FL (ref 81–99)
PDW BLD-RTO: 15.6 % (ref 11.5–14.5)
PLATELET # BLD: 293 K/UL (ref 130–400)
PMV BLD AUTO: 10.8 FL (ref 9.4–12.3)
POTASSIUM SERPL-SCNC: 3.7 MMOL/L (ref 3.5–5)
RBC # BLD: 3.83 M/UL (ref 4.2–5.4)
SODIUM BLD-SCNC: 137 MMOL/L (ref 136–145)
TOTAL PROTEIN: 6.1 G/DL (ref 6.6–8.7)
WBC # BLD: 13.1 K/UL (ref 4.8–10.8)

## 2019-08-21 PROCEDURE — 99214 OFFICE O/P EST MOD 30 MIN: CPT | Performed by: FAMILY MEDICINE

## 2019-08-21 ASSESSMENT — ENCOUNTER SYMPTOMS
GASTROINTESTINAL NEGATIVE: 1
RESPIRATORY NEGATIVE: 1
ALLERGIC/IMMUNOLOGIC NEGATIVE: 1
EYES NEGATIVE: 1

## 2019-08-21 NOTE — PROGRESS NOTES
equal, round, and reactive to light. Conjunctivae and EOM are normal.   Neck: Normal range of motion and full passive range of motion without pain. Neck supple. Cardiovascular: Normal rate, regular rhythm, S1 normal, S2 normal and normal pulses. Murmur heard. Pulmonary/Chest: Effort normal. She has decreased breath sounds. Patient oxygen   Abdominal: Soft. Normal appearance and bowel sounds are normal.   Musculoskeletal: Normal range of motion. She exhibits edema. Right wrist: She exhibits tenderness and swelling. Neurological: She is alert and oriented to person, place, and time. She has normal strength and normal reflexes. Lower extremities strength 4 out of 5 bilateral   Skin: Skin is warm, dry and intact. Psychiatric: She has a normal mood and affect. Her speech is normal and behavior is normal. Judgment and thought content normal. Cognition and memory are normal.      /70 (Site: Left Upper Arm, Position: Sitting, Cuff Size: Medium Adult)   Pulse 90   Temp 96 °F (35.6 °C) (Temporal)   Wt 141 lb (64 kg)   SpO2 (!) 85% Comment: patient is on oxygen already  BMI 25.79 kg/m²      ASSESSMENT:     Diagnosis Orders   1. Ulcer of esophagus with bleeding-appears to be healing CBC    Comprehensive Metabolic Panel   2. Gastrointestinal hemorrhage with hematemesis appears to have resolved    3. Esophageal candidiasis (HCC)-on therapy appears to be improving    4. COPD with acute exacerbation (HCC)-resolved         PLAN:    1. Continue PPI. Obtain blood work. 2.  Continue PPI and blood work. 3.  Finish Diflucan. Avoid steroids and antibiotics in the near future if possible. 4.  Continue oxygen therapy. Continue treatment plan. Avoid cigarette smoke.   Follow-up 3 to 6 months

## 2019-08-29 NOTE — TELEPHONE ENCOUNTER
Pt daughter Joy called to cancel her mothers appt. For  8/30. She is now at hospice care. Just [facility]

## 2021-06-09 NOTE — ASSESSMENT & PLAN NOTE
Check echocardiogram and BNP. Worrisome for CHF. Increase furosemide to 40 mg 2 tabs in am and 1 tab in early afternoon for next 3 days. Call if no improvement.    5